# Patient Record
Sex: FEMALE | Race: WHITE | Employment: STUDENT | URBAN - METROPOLITAN AREA
[De-identification: names, ages, dates, MRNs, and addresses within clinical notes are randomized per-mention and may not be internally consistent; named-entity substitution may affect disease eponyms.]

---

## 2018-10-19 ENCOUNTER — APPOINTMENT (OUTPATIENT)
Dept: GENERAL RADIOLOGY | Facility: CLINIC | Age: 23
End: 2018-10-19
Payer: COMMERCIAL

## 2018-10-19 ENCOUNTER — APPOINTMENT (OUTPATIENT)
Dept: GENERAL RADIOLOGY | Facility: CLINIC | Age: 23
End: 2018-10-19
Attending: EMERGENCY MEDICINE
Payer: COMMERCIAL

## 2018-10-19 ENCOUNTER — APPOINTMENT (OUTPATIENT)
Dept: GENERAL RADIOLOGY | Facility: CLINIC | Age: 23
End: 2018-10-19
Attending: FAMILY MEDICINE
Payer: COMMERCIAL

## 2018-10-19 ENCOUNTER — HOSPITAL ENCOUNTER (EMERGENCY)
Facility: CLINIC | Age: 23
Discharge: HOME | End: 2018-10-19
Attending: EMERGENCY MEDICINE | Admitting: EMERGENCY MEDICINE
Payer: COMMERCIAL

## 2018-10-19 VITALS
RESPIRATION RATE: 16 BRPM | HEART RATE: 81 BPM | DIASTOLIC BLOOD PRESSURE: 89 MMHG | OXYGEN SATURATION: 98 % | TEMPERATURE: 98.7 F | SYSTOLIC BLOOD PRESSURE: 137 MMHG | WEIGHT: 189 LBS

## 2018-10-19 DIAGNOSIS — S60.512A HAND ABRASION, LEFT, INITIAL ENCOUNTER: ICD-10-CM

## 2018-10-19 DIAGNOSIS — S52.372A CLOSED GALEAZZI'S FRACTURE OF LEFT RADIUS, INITIAL ENCOUNTER: ICD-10-CM

## 2018-10-19 LAB
HCG UR QL: NEGATIVE
INTERNAL QC OK POCT: YES

## 2018-10-19 PROCEDURE — 99285 EMERGENCY DEPT VISIT HI MDM: CPT | Mod: 25

## 2018-10-19 PROCEDURE — 25505 CLTX RDL SHFT FX W/MNPJ: CPT

## 2018-10-19 PROCEDURE — 40000986 XR FOREARM LT 2 VW

## 2018-10-19 PROCEDURE — 40000278 XR SURGERY CARM FLUORO LESS THAN 5 MIN: Mod: TC

## 2018-10-19 PROCEDURE — 25000128 H RX IP 250 OP 636

## 2018-10-19 PROCEDURE — 99285 EMERGENCY DEPT VISIT HI MDM: CPT | Mod: 25 | Performed by: EMERGENCY MEDICINE

## 2018-10-19 PROCEDURE — 73090 X-RAY EXAM OF FOREARM: CPT | Mod: LT

## 2018-10-19 PROCEDURE — 99156 MOD SED OTH PHYS/QHP 5/>YRS: CPT | Mod: Z6 | Performed by: FAMILY MEDICINE

## 2018-10-19 PROCEDURE — 40000986 XR WRIST LEFT G/E 3 VIEWS

## 2018-10-19 PROCEDURE — 81025 URINE PREGNANCY TEST: CPT | Performed by: EMERGENCY MEDICINE

## 2018-10-19 PROCEDURE — 73110 X-RAY EXAM OF WRIST: CPT | Mod: LT

## 2018-10-19 PROCEDURE — 96361 HYDRATE IV INFUSION ADD-ON: CPT | Mod: 59

## 2018-10-19 PROCEDURE — 25000132 ZZH RX MED GY IP 250 OP 250 PS 637: Performed by: FAMILY MEDICINE

## 2018-10-19 PROCEDURE — 25000128 H RX IP 250 OP 636: Performed by: EMERGENCY MEDICINE

## 2018-10-19 PROCEDURE — 96374 THER/PROPH/DIAG INJ IV PUSH: CPT

## 2018-10-19 PROCEDURE — 99156 MOD SED OTH PHYS/QHP 5/>YRS: CPT

## 2018-10-19 RX ORDER — SODIUM CHLORIDE 9 MG/ML
INJECTION, SOLUTION INTRAVENOUS
Status: COMPLETED
Start: 2018-10-19 | End: 2018-10-19

## 2018-10-19 RX ORDER — OXYCODONE HYDROCHLORIDE 5 MG/1
5 TABLET ORAL EVERY 6 HOURS PRN
Qty: 12 TABLET | Refills: 0 | Status: SHIPPED | OUTPATIENT
Start: 2018-10-19 | End: 2018-10-31

## 2018-10-19 RX ORDER — LIDOCAINE HYDROCHLORIDE 10 MG/ML
INJECTION, SOLUTION EPIDURAL; INFILTRATION; INTRACAUDAL; PERINEURAL
Status: DISCONTINUED
Start: 2018-10-19 | End: 2018-10-19 | Stop reason: HOSPADM

## 2018-10-19 RX ORDER — IBUPROFEN 800 MG/1
800 TABLET, FILM COATED ORAL EVERY 8 HOURS PRN
Qty: 20 TABLET | Refills: 0 | Status: SHIPPED | OUTPATIENT
Start: 2018-10-19 | End: 2019-03-27

## 2018-10-19 RX ORDER — PROPOFOL 10 MG/ML
50 INJECTION, EMULSION INTRAVENOUS ONCE
Status: DISCONTINUED | OUTPATIENT
Start: 2018-10-19 | End: 2018-10-19 | Stop reason: CLARIF

## 2018-10-19 RX ORDER — IBUPROFEN 800 MG/1
800 TABLET, FILM COATED ORAL ONCE
Status: COMPLETED | OUTPATIENT
Start: 2018-10-19 | End: 2018-10-19

## 2018-10-19 RX ORDER — PROPOFOL 10 MG/ML
50 INJECTION, EMULSION INTRAVENOUS ONCE
Status: COMPLETED | OUTPATIENT
Start: 2018-10-19 | End: 2018-10-19

## 2018-10-19 RX ORDER — SODIUM CHLORIDE 9 MG/ML
INJECTION, SOLUTION INTRAVENOUS
Status: DISCONTINUED
Start: 2018-10-19 | End: 2018-10-19 | Stop reason: HOSPADM

## 2018-10-19 RX ORDER — OXYCODONE AND ACETAMINOPHEN 5; 325 MG/1; MG/1
1-2 TABLET ORAL EVERY 4 HOURS PRN
Qty: 12 TABLET | Refills: 0 | Status: SHIPPED | OUTPATIENT
Start: 2018-10-19 | End: 2018-10-22

## 2018-10-19 RX ORDER — HYDROMORPHONE HYDROCHLORIDE 1 MG/ML
0.5 INJECTION, SOLUTION INTRAMUSCULAR; INTRAVENOUS; SUBCUTANEOUS
Status: DISCONTINUED | OUTPATIENT
Start: 2018-10-19 | End: 2018-10-19 | Stop reason: HOSPADM

## 2018-10-19 RX ORDER — ACETAMINOPHEN 500 MG
1000 TABLET ORAL ONCE
Status: COMPLETED | OUTPATIENT
Start: 2018-10-19 | End: 2018-10-19

## 2018-10-19 RX ADMIN — IBUPROFEN 800 MG: 800 TABLET ORAL at 12:19

## 2018-10-19 RX ADMIN — PROPOFOL 50 MG: 10 INJECTION, EMULSION INTRAVENOUS at 16:50

## 2018-10-19 RX ADMIN — ACETAMINOPHEN 1000 MG: 500 TABLET ORAL at 12:20

## 2018-10-19 RX ADMIN — SODIUM CHLORIDE 1000 ML: 9 INJECTION, SOLUTION INTRAVENOUS at 13:59

## 2018-10-19 RX ADMIN — Medication 0.5 MG: at 13:57

## 2018-10-19 ASSESSMENT — ENCOUNTER SYMPTOMS
COLOR CHANGE: 0
CONFUSION: 0
NUMBNESS: 1
ARTHRALGIAS: 0
NECK STIFFNESS: 0
DIFFICULTY URINATING: 0
HEADACHES: 0
SHORTNESS OF BREATH: 0
FEVER: 0
EYE REDNESS: 0
ABDOMINAL PAIN: 0
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
## 2018-10-19 NOTE — ED AVS SNAPSHOT
[facility], Emergency Department    [address] [place] [address]    Phone:  [phone]    Fax:  [phone]                                       Gela Miles   MRN: [id number]    Department:  [facility], Emergency Department   Date of Visit:  10/19/2018           After Visit Summary Signature Page     I have received my discharge instructions, and my questions have been answered. I have discussed any challenges I see with this plan with the nurse or doctor.    ..........................................................................................................................................  Patient/Patient Representative Signature      ..........................................................................................................................................  Patient Representative Print Name and Relationship to Patient    ..................................................               ................................................  Date                                   Time    ..........................................................................................................................................  Reviewed by Signature/Title    ...................................................              ..............................................  Date                                               Time          22EPIC Rev 08/18

## 2018-10-19 NOTE — ED AVS SNAPSHOT
[facility], Emergency Department    [address]    [place] [address]    Phone:  [phone]    Fax:  [phone]                                       Gela Miles   MRN: [id number]    Department:  [facility], Emergency Department   Date of Visit:  10/19/2018           Patient Information     Date Of Birth          1995        Your diagnoses for this visit were:     Closed Galeazzi's fracture of left radius, initial encounter     Hand abrasion, left, initial encounter        You were seen by Joao Jonas MD and Darrel Tomas MD.        Discharge Instructions       Follow-up with orthopedics as discussed with the physician in the emergency department today    Abrasions  Abrasions are skin scrapes. Their treatment depends on how large and deep the abrasion is.  Home care  You may be prescribed an antibiotic cream or ointment to apply to the wound. This helps prevent infection. Follow instructions when using this medicine.  General care    To care for the abrasion, do the following each day for as long as directed by your healthcare provider.  ? If you were given a bandage, change it once a day. If your bandage sticks to the wound, soak it in warm water until it loosens.  ? Wash the area with soap and warm water. You may do this in a sink or under a tub faucet or shower. Rinse off the soap. Then pat the area dry with a clean towel.  ? If antibiotic ointment or cream was prescribed, reapply it to the wound as directed. Cover the wound with a fresh nonstick bandage. If the bandage becomes wet or dirty, change it as soon as possible.  ? Some antibiotic ointments or cream can cause an allergic reaction or dermatitis. This may cause redness, itching and or hives. If this occurs, stop using the ointment immediately and wash off any remaining ointment. You may need to take some allergy medicine to relieve symptoms.    You may use acetaminophen or ibuprofen to control pain unless another pain  medicine was prescribed. Talk with your healthcare provider before using these medicines if you have chronic liver or kidney disease or ever had a stomach ulcer or GI bleeding. Don t use ibuprofen in children younger than six months old.    Most skin wounds heal within 10 days. But an infection may occur even with treatment. So it s important to watch the wound for signs of infection as listed below.  Follow-up care  Follow up with your healthcare provider, or as advised.  When to seek medical advice  Call your healthcare provider right away if any of these occur:    Fever of 100.4 F (38 C) or higher, or as directed by your healthcare provider    Increasing pain, redness, swelling, or drainage from the wound    Bleeding from the wound that does not stop after a few minutes of steady, firm pressure    Decreased ability to move any body part near the wound  Date Last Reviewed: 3/3/2017    0970-9846 [employer]. [address] [place] [address]. All rights reserved. This information is not intended as a substitute for professional medical care. Always follow your healthcare professional's instructions.        Upper Extremity Fracture    You have a break (fracture) of the arm, wrist, or hand. This may be a small crack in the bone. Or it may be a major break, with the broken parts pushed out of position. Most fractures will heal without surgery. But you may need surgery if the bones are far out of place or if the break is near the elbow. Treatment is with a special sling called a shoulder immobilizer, or a splint or cast, depending on the type of fracture and where the fracture is. This fracture takes 4 to 6 weeks or longer to heal. The cast may need to be changed in 2 to 3 weeks as swelling goes down.  Home care  Follow these guidelines when caring for yourself:    If you were given a shoulder immobilizer, leave it in place. This will support the injured arm at your side. This is the best position  for bone healing. The shoulder immobilizer can be adjusted. If it becomes loose, adjust it so that your forearm is level with the ground (horizontal). Your hand should be level with your elbow.    Put an ice pack on the injured area. Do this for 20 minutes every 1 to 2 hours the first day for pain relief. You can make an ice pack by wrapping a plastic bag of ice cubes in a thin towel. As the ice melts, be careful that the cast/splint/sling doesn t get wet. You can put the ice pack inside the sling and directly over the splint or cast. Continue using the ice pack 3 to 4 times a day for the next 2 days. Then use the ice pack as needed to ease pain and swelling.    Keep the cast, splint, or sling completely dry at all times. Bathe with your cast, splint, or sling out of the water. Protect it with a large plastic bag, rubber-banded at the top end. If a fiberglass cast, splint, or sling gets wet, you can dry it with a hair dryer.    You may use acetaminophen or ibuprofen to control pain, unless another pain medicine was prescribed. If you have chronic liver or kidney disease, talk with your healthcare provider before using these medicines. Also talk with your provider if you ve had a stomach ulcer or gastrointestinal bleeding.    Don t put creams or objects under the cast if you have itching.  Follow-up care  Follow up with your healthcare provider, or as advised. This is to make sure the bone is healing the way it should.  X-rays may be taken. You will be told of any new findings that may affect your care.  When to seek medical advice  Call your health care provider right away if any of these occur:    The cast or splint cracks    The plaster cast or splint becomes wet or soft    The fiberglass cast or splint stays wet for more than 24 hours    Bad odor from the cast or wound fluid stains the cast    Tightness or pain under the cast or splint gets worse    Fingers become swollen, cold, blue, numb, or tingly    You can t  move your fingers    Skin around cast or splint becomes red    Fever of 100.4 F (38 C) or higher, or as directed by your healthcare provider  Date Last Reviewed: 2/1/2017 2000-2017 [employer]. [address] [place] [address], [place] [address]. All rights reserved. This information is not intended as a substitute for professional medical care. Always follow your healthcare professional's instructions.          Forearm Fracture That Needs to Be Set  You have a break or fracture of both bones in the forearm. The bones are out of place and must be set to make them straight again. This fracture usually takes 8 to 12 weeks to heal completely. Initial treatment is with a splint or cast. Severe injuries may need surgery to repair.    Home care    Keep your arm elevated to reduce pain and swelling.When sitting or lying down elevate your arm above the level of your heart. You can do this by placing your arm on a pillow that rests on your chest or on a pillow at your side. This is most important during the first 48 hours after injury.    Apply an ice pack over the injured area for 15 to 20 minutes every 3 to 6 hours. You should do this for the first 24 to 48 hours. You can make an ice pack by filling a plastic bag that seals at the top with ice cubes and then wrapping it with a thin towel. Be careful not to injure your skin with the ice treatments. Ice should never be applied directly to skin. As the ice melts, be careful that the cast or splint doesn t get wet. You can place the ice pack inside the sling and directly over the splint or cast. Continue with ice packs as needed for the relief of pain and swelling.    Keep the cast or splint completely dry at all times. Bathe with your cast or splint out of the water. Protect it with 2 large plastic bags, one outside of the other, each taped with duct tape at the top end. If a fiberglass splint or cast gets wet, you can dry it with a hair dryer on a cool setting.    You  may use over-the-counter pain medicine to control pain, unless another pain medicine was prescribed. If you have chronic liver or kidney disease or ever had a stomach ulcer or GI bleeding, talk with your healthcare provider before using these medicines.  Follow-up care  Follow up with your healthcare provider, or as advised. If a splint was applied, it may be changed to a cast during your follow-up visit.  There is a chance that the fractures will move out of place again before the ends begin to seal together. This usually happens during the first week. Therefore, it is important that you follow-up as directed for another X-ray. If X-rays were taken, you will be told of any new findings that may affect your care.  When to seek medical advice  Call your healthcare provider right away if any of the following occur:    The plaster cast or splint becomes wet or soft    The fiberglass cast or splint stays wet for more than 24 hours    Increased tightness, looseness, or pain occurs under the cast or splint    Fingers become swollen, cold, blue, numb, or tingly    The cast develops a foul odor  Date Last Reviewed: 12/3/2015    9425-7221 [employer]. [address] [place] [address]. All rights reserved. This information is not intended as a substitute for professional medical care. Always follow your healthcare professional's instructions.          24 Hour Appointment Hotline       To make an appointment at any [facility], call [phone] ([phone]). If you don't have a family doctor or clinic, we will help you find one. [facility] clinics are conveniently located to serve the needs of you and your family.             Review of your medicines      START taking        Dose / Directions Last dose taken    ibuprofen 800 MG tablet   Commonly known as:  ADVIL/MOTRIN   Dose:  800 mg   Quantity:  20 tablet        Take 1 tablet (800 mg) by mouth every 8 hours as needed for moderate pain    Refills:  0        oxyCODONE IR 5 MG tablet   Commonly known as:  ROXICODONE   Dose:  5 mg   Quantity:  12 tablet        Take 1 tablet (5 mg) by mouth every 6 hours as needed for pain   Refills:  0        oxyCODONE-acetaminophen 5-325 MG per tablet   Commonly known as:  PERCOCET   Dose:  1-2 tablet   Quantity:  12 tablet        Take 1-2 tablets by mouth every 4 hours as needed for pain   Refills:  0          Our records show that you are taking the medicines listed below. If these are incorrect, please call your family doctor or clinic.        Dose / Directions Last dose taken    levonorgestrel 20 MCG/24HR IUD   Commonly known as:  MIRENA   Dose:  1 each        1 each by Intrauterine route once   Refills:  0                Information about OPIOIDS     PRESCRIPTION OPIOIDS: WHAT YOU NEED TO KNOW   We gave you an opioid (narcotic) pain medicine. It is important to manage your pain, but opioids are not always the best choice. You should first try all the other options your care team gave you. Take this medicine for as short a time (and as few doses) as possible.    Some activities can increase your pain, such as bandage changes or therapy sessions. It may help to take your pain medicine 30 to 60 minutes before these activities. Reduce your stress by getting enough sleep, working on hobbies you enjoy and practicing relaxation or meditation. Talk to your care team about ways to manage your pain beyond prescription opioids.    These medicines have risks:    DO NOT drive when on new or higher doses of pain medicine. These medicines can affect your alertness and reaction times, and you could be arrested for driving under the influence (DUI). If you need to use opioids long-term, talk to your care team about driving.    DO NOT operate heavy machinery    DO NOT do any other dangerous activities while taking these medicines.    DO NOT drink any alcohol while taking these medicines.     If the opioid prescribed includes  acetaminophen, DO NOT take with any other medicines that contain acetaminophen. Read all labels carefully. Look for the word  acetaminophen  or  Tylenol.  Ask your pharmacist if you have questions or are unsure.    You can get addicted to pain medicines, especially if you have a history of addiction (chemical, alcohol or substance dependence). Talk to your care team about ways to reduce this risk.    All opioids tend to cause constipation. Drink plenty of water and eat foods that have a lot of fiber, such as fruits, vegetables, prune juice, apple juice and high-fiber cereal. Take a laxative (Miralax, milk of magnesia, Colace, Senna) if you don t move your bowels at least every other day. Other side effects include upset stomach, sleepiness, dizziness, throwing up, tolerance (needing more of the medicine to have the same effect), physical dependence and slowed breathing.    Store your pills in a secure place, locked if possible. We will not replace any lost or stolen medicine. If you don t finish your medicine, please throw away (dispose) as directed by your pharmacist. The [place] [employer] has more information about safe disposal: https://www.pca.[facility].[place]/living-green/managing-unwanted-medications        Prescriptions were sent or printed at these locations (3 Prescriptions)                   Other Prescriptions                Printed at Department/Unit printer (3 of 3)         ibuprofen (ADVIL/MOTRIN) 800 MG tablet               oxyCODONE IR (ROXICODONE) 5 MG tablet               oxyCODONE-acetaminophen (PERCOCET) 5-325 MG per tablet                Procedures and tests performed during your visit     Procedure/Test Number of Times Performed    Radius/Ulna XR,  PA &LAT, left 1    XR Forearm Left 2 Views 1    XR Surgery HARDEEP Fluoro L/T 5 Min 1    XR Wrist Left G/E 3 Views 2    hCG qual urine POCT 1      Orders Needing Specimen Collection     None      Pending Results     Date and Time Order Name  "Status Description    10/19/2018 1808 XR Forearm Left 2 Views Preliminary     10/19/2018 1808 XR Wrist Left G/E 3 Views Preliminary     10/19/2018 1335 XR Surgery HARDEEP Fluoro L/T 5 Min In process             Pending Culture Results     No orders found from 10/17/2018 to 10/20/2018.            Pending Results Instructions     If you had any lab results that were not finalized at the time of your Discharge, you can call the ED Lab Result RN at [phone]. You will be contacted by this team for any positive Lab results or changes in treatment. The nurses are available 7 days a week from 10A to 6:30P.  You can leave a message 24 hours per day and they will return your call.        Thank you for choosing [facility]       Thank you for choosing [facility] for your care. Our goal is always to provide you with excellent care. Hearing back from our patients is one way we can continue to improve our services. Please take a few minutes to complete the written survey that you may receive in the mail after you visit with us. Thank you!        [employer] Information     [employer] lets you send messages to your doctor, view your test results, renew your prescriptions, schedule appointments and more. To sign up, go to [email or web address] . Click on \"Log in\" on the left side of the screen, which will take you to the Welcome page. Then click on \"Sign up Now\" on the right side of the page.     You will be asked to enter the access code listed below, as well as some personal information. Please follow the directions to create your username and password.     Your access code is: [email or web address]  Expires: 2019  7:35 PM     Your access code will  in 90 days. If you need help or a new code, please call your [facility] clinic or [phone].        Care EveryWhere ID     This is your Care EveryWhere ID. This could be used by other organizations to access your [facility] medical records  [id number]        Equal Access to Services     " WOJCIECH NOGUERA : Hadii giselle Schwartz, waaxda luqadaha, qaybta kaalmada juanis, miya barreto. So [facility] [phone].    ATENCIÓN: Si habla español, tiene a heart disposición servicios gratuitos de asistencia lingüística. Llame al [phone].    We comply with applicable federal civil rights laws and [place] laws. We do not discriminate on the basis of race, color, national origin, age, disability, sex, sexual orientation, or gender identity.            After Visit Summary       This is your record. Keep this with you and show to your community pharmacist(s) and doctor(s) at your next visit.

## 2018-10-20 NOTE — ED NOTES
Sign out Provider: Pritesh      Sign out Plan: She is known to have a both bone fracture of the left forearm.  She was seen in consultation by orthopedics, and a plan was formulated to perform closed reduction under conscious sedation.    [facility] Procedure Note        Sedation:      Performed by: Darrel Tomas  Authorized by: Darrel Tomas    Pre-Procedure Assessment done at 1600.    Expected Level:  Moderate Sedation    Indication:  Sedation is required to allow for fracture reduction    Consent obtained from patient after discussing the risks, benefits and alternatives.    PO Intake:  Appropriately NPO for procedure    ASA Class:  Class 1 - HEALTHY PATIENT    Mallampati:  Grade 2:  Soft palate, base of uvula, tonsillar pillars, and portion of posterior pharyngeal wall visible    Lungs: Lungs Clear with good breath sounds bilaterally.     Heart: Normal heart sounds and rate    History and physical reviewed and no updates needed. I have reviewed the lab findings, diagnostic data, medications, and the plan for sedation. I have determined this patient to be an appropriate candidate for the planned sedation and procedure and have reassessed the patient IMMEDIATELY PRIOR to sedation and procedure.      Sedation Post Procedure Summary:    Prior to the start of the procedure and with procedural staff participation, I verbally confirmed the patient s identity using two indicators, relevant allergies, that the procedure was appropriate and matched the consent or emergent situation, and that the correct equipment/implants were available. Immediately prior to starting the procedure I conducted the Time Out with the procedural staff and re-confirmed the patient s name, procedure, and site/side. (The Joint Commission universal protocol was followed.)  Yes      Sedatives: Propofol    Vital signs, airway, End Tidal CO2 and pulse oximetry were monitored and remained stable throughout the procedure and sedation was  maintained until the procedure was complete.  The patient was monitored by staff until sedation discharge criteria were met.    Patient tolerance: Patient tolerated the procedure well with no immediate complications.    Time of sedation in minutes:  15 minutes from beginning to end of physician one to one monitoring.      Reassessment: Patient has been consented for conscious sedation.  Procedure was performed as documented in the chart above and by orthopedics.  She tolerated the procedure well      Disposition: Post reduction films show adequate reduction.  Plan is for her to follow-up with orthopedics next week for ORIF.  We discussed the indications for emergency department return and follow-up.  Stable for discharge.         Darrel Tomas MD  10/19/18 1938

## 2018-10-20 NOTE — DISCHARGE INSTRUCTIONS
Follow-up with orthopedics as discussed with the physician in the emergency department today    Abrasions  Abrasions are skin scrapes. Their treatment depends on how large and deep the abrasion is.  Home care  You may be prescribed an antibiotic cream or ointment to apply to the wound. This helps prevent infection. Follow instructions when using this medicine.  General care    To care for the abrasion, do the following each day for as long as directed by your healthcare provider.  ? If you were given a bandage, change it once a day. If your bandage sticks to the wound, soak it in warm water until it loosens.  ? Wash the area with soap and warm water. You may do this in a sink or under a tub faucet or shower. Rinse off the soap. Then pat the area dry with a clean towel.  ? If antibiotic ointment or cream was prescribed, reapply it to the wound as directed. Cover the wound with a fresh nonstick bandage. If the bandage becomes wet or dirty, change it as soon as possible.  ? Some antibiotic ointments or cream can cause an allergic reaction or dermatitis. This may cause redness, itching and or hives. If this occurs, stop using the ointment immediately and wash off any remaining ointment. You may need to take some allergy medicine to relieve symptoms.    You may use acetaminophen or ibuprofen to control pain unless another pain medicine was prescribed. Talk with your healthcare provider before using these medicines if you have chronic liver or kidney disease or ever had a stomach ulcer or GI bleeding. Don t use ibuprofen in children younger than six months old.    Most skin wounds heal within 10 days. But an infection may occur even with treatment. So it s important to watch the wound for signs of infection as listed below.  Follow-up care  Follow up with your healthcare provider, or as advised.  When to seek medical advice  Call your healthcare provider right away if any of these occur:    Fever of 100.4 F (38 C) or  higher, or as directed by your healthcare provider    Increasing pain, redness, swelling, or drainage from the wound    Bleeding from the wound that does not stop after a few minutes of steady, firm pressure    Decreased ability to move any body part near the wound  Date Last Reviewed: 3/3/2017    3868-9380 [employer]. [address] [place], [place] [id number]. All rights reserved. This information is not intended as a substitute for professional medical care. Always follow your healthcare professional's instructions.        Upper Extremity Fracture    You have a break (fracture) of the arm, wrist, or hand. This may be a small crack in the bone. Or it may be a major break, with the broken parts pushed out of position. Most fractures will heal without surgery. But you may need surgery if the bones are far out of place or if the break is near the elbow. Treatment is with a special sling called a shoulder immobilizer, or a splint or cast, depending on the type of fracture and where the fracture is. This fracture takes 4 to 6 weeks or longer to heal. The cast may need to be changed in 2 to 3 weeks as swelling goes down.  Home care  Follow these guidelines when caring for yourself:    If you were given a shoulder immobilizer, leave it in place. This will support the injured arm at your side. This is the best position for bone healing. The shoulder immobilizer can be adjusted. If it becomes loose, adjust it so that your forearm is level with the ground (horizontal). Your hand should be level with your elbow.    Put an ice pack on the injured area. Do this for 20 minutes every 1 to 2 hours the first day for pain relief. You can make an ice pack by wrapping a plastic bag of ice cubes in a thin towel. As the ice melts, be careful that the cast/splint/sling doesn t get wet. You can put the ice pack inside the sling and directly over the splint or cast. Continue using the ice pack 3 to 4 times a day for the next  2 days. Then use the ice pack as needed to ease pain and swelling.    Keep the cast, splint, or sling completely dry at all times. Bathe with your cast, splint, or sling out of the water. Protect it with a large plastic bag, rubber-banded at the top end. If a fiberglass cast, splint, or sling gets wet, you can dry it with a hair dryer.    You may use acetaminophen or ibuprofen to control pain, unless another pain medicine was prescribed. If you have chronic liver or kidney disease, talk with your healthcare provider before using these medicines. Also talk with your provider if you ve had a stomach ulcer or gastrointestinal bleeding.    Don t put creams or objects under the cast if you have itching.  Follow-up care  Follow up with your healthcare provider, or as advised. This is to make sure the bone is healing the way it should.  X-rays may be taken. You will be told of any new findings that may affect your care.  When to seek medical advice  Call your health care provider right away if any of these occur:    The cast or splint cracks    The plaster cast or splint becomes wet or soft    The fiberglass cast or splint stays wet for more than 24 hours    Bad odor from the cast or wound fluid stains the cast    Tightness or pain under the cast or splint gets worse    Fingers become swollen, cold, blue, numb, or tingly    You can t move your fingers    Skin around cast or splint becomes red    Fever of 100.4 F (38 C) or higher, or as directed by your healthcare provider  Date Last Reviewed: 2/1/2017 2000-2017 [employer]. [address] [place] [address]. All rights reserved. This information is not intended as a substitute for professional medical care. Always follow your healthcare professional's instructions.          Forearm Fracture That Needs to Be Set  You have a break or fracture of both bones in the forearm. The bones are out of place and must be set to make them straight again. This  fracture usually takes 8 to 12 weeks to heal completely. Initial treatment is with a splint or cast. Severe injuries may need surgery to repair.    Home care    Keep your arm elevated to reduce pain and swelling.When sitting or lying down elevate your arm above the level of your heart. You can do this by placing your arm on a pillow that rests on your chest or on a pillow at your side. This is most important during the first 48 hours after injury.    Apply an ice pack over the injured area for 15 to 20 minutes every 3 to 6 hours. You should do this for the first 24 to 48 hours. You can make an ice pack by filling a plastic bag that seals at the top with ice cubes and then wrapping it with a thin towel. Be careful not to injure your skin with the ice treatments. Ice should never be applied directly to skin. As the ice melts, be careful that the cast or splint doesn t get wet. You can place the ice pack inside the sling and directly over the splint or cast. Continue with ice packs as needed for the relief of pain and swelling.    Keep the cast or splint completely dry at all times. Bathe with your cast or splint out of the water. Protect it with 2 large plastic bags, one outside of the other, each taped with duct tape at the top end. If a fiberglass splint or cast gets wet, you can dry it with a hair dryer on a cool setting.    You may use over-the-counter pain medicine to control pain, unless another pain medicine was prescribed. If you have chronic liver or kidney disease or ever had a stomach ulcer or GI bleeding, talk with your healthcare provider before using these medicines.  Follow-up care  Follow up with your healthcare provider, or as advised. If a splint was applied, it may be changed to a cast during your follow-up visit.  There is a chance that the fractures will move out of place again before the ends begin to seal together. This usually happens during the first week. Therefore, it is important that you  follow-up as directed for another X-ray. If X-rays were taken, you will be told of any new findings that may affect your care.  When to seek medical advice  Call your healthcare provider right away if any of the following occur:    The plaster cast or splint becomes wet or soft    The fiberglass cast or splint stays wet for more than 24 hours    Increased tightness, looseness, or pain occurs under the cast or splint    Fingers become swollen, cold, blue, numb, or tingly    The cast develops a foul odor  Date Last Reviewed: 12/3/2015    1407-1611 [employer]. [address] [place] [address]. All rights reserved. This information is not intended as a substitute for professional medical care. Always follow your healthcare professional's instructions.

## 2018-10-22 ENCOUNTER — OFFICE VISIT (OUTPATIENT)
Dept: ORTHOPEDICS | Facility: CLINIC | Age: 23
End: 2018-10-22
Payer: COMMERCIAL

## 2018-10-22 ENCOUNTER — ANESTHESIA EVENT (OUTPATIENT)
Dept: SURGERY | Facility: AMBULATORY SURGERY CENTER | Age: 23
End: 2018-10-22

## 2018-10-22 VITALS — HEIGHT: 70 IN | BODY MASS INDEX: 27.26 KG/M2 | WEIGHT: 190.4 LBS

## 2018-10-22 DIAGNOSIS — S52.372A CLOSED GALEAZZI'S FRACTURE OF LEFT RADIUS, INITIAL ENCOUNTER: Primary | ICD-10-CM

## 2018-10-22 RX ORDER — OXYCODONE AND ACETAMINOPHEN 5; 325 MG/1; MG/1
1-2 TABLET ORAL EVERY 4 HOURS PRN
Qty: 12 TABLET | Refills: 0 | Status: SHIPPED | OUTPATIENT
Start: 2018-10-22 | End: 2019-03-27

## 2018-10-22 ASSESSMENT — ENCOUNTER SYMPTOMS
BACK PAIN: 0
NECK PAIN: 0
MUSCLE CRAMPS: 0
STIFFNESS: 0
MUSCLE WEAKNESS: 0
JOINT SWELLING: 0
ARTHRALGIAS: 0
MYALGIAS: 0
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
## 2018-10-22 NOTE — NURSING NOTE
Teaching Flowsheet   Relevant Diagnosis: Left galeazzi fracture  Teaching Topic: ORIF left radial shaft fracture, possible TFCC repair     Person(s) involved in teaching:   Patient and boyfriend     Motivation Level:  Asks Questions: Yes  Eager to Learn: Yes  Cooperative: Yes  Receptive (willing/able to accept information): Yes  Any cultural factors/Protestant beliefs that may influence understanding or compliance? No     Patient demonstrates understanding of the following:  Reason for the appointment, diagnosis and treatment plan: Yes  Knowledge of proper use of medications and conditions for which they are ordered (with special attention to potential side effects or drug interactions): Yes  Which situations necessitate calling provider and whom to contact: Yes    Teaching Concerns Addressed:   Comments: N/a     Nutritional needs and diet plan: Yes  Pain management techniques: Yes  Wound Care: Yes  How and/when to access community resources: Yes     Instructional Materials Used/Given: Pre-op packet given and reviewed with patient. Antiseptic soap given.  Patient will have surgery tomorrow, 10/23/18, [place] [place] [facility]  She was given a refill of percocet today.  She was also given a letter for missing school today and tomorrow.      Time spent with patient: 15 minutes.

## 2018-10-22 NOTE — MR AVS SNAPSHOT
After Visit Summary   10/22/2018    Gela Miles    MRN: [id number]           Patient Information     Date Of Birth          1995        Visit Information        Provider Department      10/22/2018 8:30 AM Barron Ybarra MD [facility]        Today's Diagnoses     Closed Galeazzi's fracture of left radius, initial encounter    -  1       Follow-ups after your visit        Your next 10 appointments already scheduled     Oct 23, 2018   Procedure with Barron Ybarra MD   [facility] ([facility])    [address] [place] [address]  5th [facility] [id number] [phone]           Located in the Clinics and Surgery Center at [address] [place] [address].   parking is very convenient and highly recommended.  is a $6 flat rate fee.  Both  and self parkers should enter the main arrival plaza from [facility]; parking attendants will direct you based on your parking preference.              Who to contact     Please call your clinic at [phone] to:    Ask questions about your health    Make or cancel appointments    Discuss your medicines    Learn about your test results    Speak to your doctor            Additional Information About Your Visit        MyChart Information     [employer] is an electronic gateway that provides easy, online access to your medical records. With [employer], you can request a clinic appointment, read your test results, renew a prescription or communicate with your care team.     To sign up for [employer] visit the website at [email or web address]   You will be asked to enter the access code listed below, as well as some personal information. Please follow the directions to create your username and password.     Your access code is: [email or web address]  Expires: 2019  7:35 PM     Your access code will  in 90 days. If you need help or a new code, please contact your  "[facility] or call [phone] for assistance.        Care EveryWhere ID     This is your Care EveryWhere ID. This could be used by other organizations to access your [place] medical records  [id number]        Your Vitals Were     Height BMI (Body Mass Index)                1.77 m (5' 9.69\") 27.57 kg/m2           Blood Pressure from Last 3 Encounters:   10/19/18 137/89    Weight from Last 3 Encounters:   10/22/18 86.4 kg (190 lb 6.4 oz)   10/19/18 85.7 kg (189 lb)              We Performed the Following     Etta-Operative Worksheet (Hand)          Where to get your medicines      Some of these will need a paper prescription and others can be bought over the counter.  Ask your nurse if you have questions.     Bring a paper prescription for each of these medications     oxyCODONE-acetaminophen 5-325 MG per tablet         Information about OPIOIDS     PRESCRIPTION OPIOIDS: WHAT YOU NEED TO KNOW   We gave you an opioid (narcotic) pain medicine. It is important to manage your pain, but opioids are not always the best choice. You should first try all the other options your care team gave you. Take this medicine for as short a time (and as few doses) as possible.    Some activities can increase your pain, such as bandage changes or therapy sessions. It may help to take your pain medicine 30 to 60 minutes before these activities. Reduce your stress by getting enough sleep, working on hobbies you enjoy and practicing relaxation or meditation. Talk to your care team about ways to manage your pain beyond prescription opioids.    These medicines have risks:    DO NOT drive when on new or higher doses of pain medicine. These medicines can affect your alertness and reaction times, and you could be arrested for driving under the influence (DUI). If you need to use opioids long-term, talk to your care team about driving.    DO NOT operate heavy machinery    DO NOT do any other dangerous activities " while taking these medicines.    DO NOT drink any alcohol while taking these medicines.     If the opioid prescribed includes acetaminophen, DO NOT take with any other medicines that contain acetaminophen. Read all labels carefully. Look for the word  acetaminophen  or  Tylenol.  Ask your pharmacist if you have questions or are unsure.    You can get addicted to pain medicines, especially if you have a history of addiction (chemical, alcohol or substance dependence). Talk to your care team about ways to reduce this risk.    All opioids tend to cause constipation. Drink plenty of water and eat foods that have a lot of fiber, such as fruits, vegetables, prune juice, apple juice and high-fiber cereal. Take a laxative (Miralax, milk of magnesia, Colace, Senna) if you don t move your bowels at least every other day. Other side effects include upset stomach, sleepiness, dizziness, throwing up, tolerance (needing more of the medicine to have the same effect), physical dependence and slowed breathing.    Store your pills in a secure place, locked if possible. We will not replace any lost or stolen medicine. If you don t finish your medicine, please throw away (dispose) as directed by your pharmacist. The [place] Pollution Control Agency has more information about safe disposal: https://www.pca.[facility].mn.us/living-green/managing-unwanted-medications         Primary Care Provider    Provider Not In System                Equal Access to Services     WOJCIECH NOGUERA : Anibal Schwartz, waguilleda lupauladaha, qaybta kaalmada juanis, miya barreto. So [facility] [phone].    ATENCIÓN: Si habla español, tiene a heart disposición servicios gratuitos de asistencia lingüística. Llame al [phone].    We comply with applicable federal civil rights laws and [place] laws. We do not discriminate on the basis of race, color, national origin, age, disability, sex, sexual orientation, or gender  identity.            Thank you!     Thank you for choosing [facility]  for your care. Our goal is always to provide you with excellent care. Hearing back from our patients is one way we can continue to improve our services. Please take a few minutes to complete the written survey that you may receive in the mail after your visit with us. Thank you!             Your Updated Medication List - Protect others around you: Learn how to safely use, store and throw away your medicines at www.disposemymeds.org.          This list is accurate as of 10/22/18 11:44 AM.  Always use your most recent med list.                   Brand Name Dispense Instructions for use Diagnosis    ibuprofen 800 MG tablet    ADVIL/MOTRIN    20 tablet    Take 1 tablet (800 mg) by mouth every 8 hours as needed for moderate pain        levonorgestrel 20 MCG/24HR IUD    MIRENA     1 each by Intrauterine route once        oxyCODONE IR 5 MG tablet    ROXICODONE    12 tablet    Take 1 tablet (5 mg) by mouth every 6 hours as needed for pain        oxyCODONE-acetaminophen 5-325 MG per tablet    PERCOCET    12 tablet    Take 1-2 tablets by mouth every 4 hours as needed for pain
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
## 2018-10-22 NOTE — LETTER
Date:October 23, 2018      Patient was self referred, no letter generated. Do not send.        [facility] Health Information

## 2018-10-23 ENCOUNTER — SURGERY (OUTPATIENT)
Age: 23
End: 2018-10-23

## 2018-10-23 ENCOUNTER — HOSPITAL ENCOUNTER (OUTPATIENT)
Facility: AMBULATORY SURGERY CENTER | Age: 23
End: 2018-10-23
Attending: ORTHOPAEDIC SURGERY
Payer: COMMERCIAL

## 2018-10-23 ENCOUNTER — RADIANT APPOINTMENT (OUTPATIENT)
Dept: RADIOLOGY | Facility: AMBULATORY SURGERY CENTER | Age: 23
End: 2018-10-23
Attending: ORTHOPAEDIC SURGERY
Payer: COMMERCIAL

## 2018-10-23 ENCOUNTER — ANESTHESIA (OUTPATIENT)
Dept: SURGERY | Facility: AMBULATORY SURGERY CENTER | Age: 23
End: 2018-10-23

## 2018-10-23 VITALS
TEMPERATURE: 99.1 F | SYSTOLIC BLOOD PRESSURE: 119 MMHG | DIASTOLIC BLOOD PRESSURE: 68 MMHG | HEIGHT: 70 IN | HEART RATE: 78 BPM | BODY MASS INDEX: 27.29 KG/M2 | RESPIRATION RATE: 15 BRPM | WEIGHT: 190.6 LBS | OXYGEN SATURATION: 95 %

## 2018-10-23 DIAGNOSIS — S52.333A: Primary | ICD-10-CM

## 2018-10-23 DIAGNOSIS — S52.92XA FRACTURE OF LEFT RADIUS: ICD-10-CM

## 2018-10-23 DEVICE — IMP SCR SYN CORTEX 3.5X16MM SELF TAP SS 204.816: Type: IMPLANTABLE DEVICE | Site: RADIUS | Status: FUNCTIONAL

## 2018-10-23 DEVICE — IMP SCR SYN CORTEX 3.5X14MM SELF TAP SS 204.814: Type: IMPLANTABLE DEVICE | Site: RADIUS | Status: FUNCTIONAL

## 2018-10-23 DEVICE — IMPLANTABLE DEVICE: Type: IMPLANTABLE DEVICE | Site: ULNA | Status: FUNCTIONAL

## 2018-10-23 RX ORDER — ONDANSETRON 4 MG/1
4 TABLET, ORALLY DISINTEGRATING ORAL EVERY 30 MIN PRN
Status: DISCONTINUED | OUTPATIENT
Start: 2018-10-23 | End: 2018-10-24 | Stop reason: HOSPADM

## 2018-10-23 RX ORDER — SODIUM CHLORIDE, SODIUM LACTATE, POTASSIUM CHLORIDE, CALCIUM CHLORIDE 600; 310; 30; 20 MG/100ML; MG/100ML; MG/100ML; MG/100ML
INJECTION, SOLUTION INTRAVENOUS CONTINUOUS
Status: DISCONTINUED | OUTPATIENT
Start: 2018-10-23 | End: 2018-10-23 | Stop reason: HOSPADM

## 2018-10-23 RX ORDER — IBUPROFEN 600 MG/1
600 TABLET, FILM COATED ORAL EVERY 6 HOURS PRN
Qty: 30 TABLET | Refills: 0 | Status: SHIPPED | OUTPATIENT
Start: 2018-10-23 | End: 2019-03-27

## 2018-10-23 RX ORDER — OXYCODONE HYDROCHLORIDE 5 MG/1
5 TABLET ORAL
Status: DISCONTINUED | OUTPATIENT
Start: 2018-10-23 | End: 2018-10-24 | Stop reason: HOSPADM

## 2018-10-23 RX ORDER — ONDANSETRON 2 MG/ML
INJECTION INTRAMUSCULAR; INTRAVENOUS PRN
Status: DISCONTINUED | OUTPATIENT
Start: 2018-10-23 | End: 2018-10-23

## 2018-10-23 RX ORDER — KETAMINE HYDROCHLORIDE 10 MG/ML
INJECTION, SOLUTION INTRAMUSCULAR; INTRAVENOUS PRN
Status: DISCONTINUED | OUTPATIENT
Start: 2018-10-23 | End: 2018-10-23

## 2018-10-23 RX ORDER — BUPIVACAINE HYDROCHLORIDE AND EPINEPHRINE 5; 5 MG/ML; UG/ML
INJECTION, SOLUTION PERINEURAL PRN
Status: DISCONTINUED | OUTPATIENT
Start: 2018-10-23 | End: 2018-10-23

## 2018-10-23 RX ORDER — NALOXONE HYDROCHLORIDE 0.4 MG/ML
.1-.4 INJECTION, SOLUTION INTRAMUSCULAR; INTRAVENOUS; SUBCUTANEOUS
Status: DISCONTINUED | OUTPATIENT
Start: 2018-10-23 | End: 2018-10-24 | Stop reason: HOSPADM

## 2018-10-23 RX ORDER — PROPOFOL 10 MG/ML
INJECTION, EMULSION INTRAVENOUS PRN
Status: DISCONTINUED | OUTPATIENT
Start: 2018-10-23 | End: 2018-10-23

## 2018-10-23 RX ORDER — FENTANYL CITRATE 50 UG/ML
25-50 INJECTION, SOLUTION INTRAMUSCULAR; INTRAVENOUS
Status: DISCONTINUED | OUTPATIENT
Start: 2018-10-23 | End: 2018-10-23 | Stop reason: HOSPADM

## 2018-10-23 RX ORDER — OXYCODONE HYDROCHLORIDE 5 MG/1
5 TABLET ORAL EVERY 4 HOURS PRN
Status: DISCONTINUED | OUTPATIENT
Start: 2018-10-23 | End: 2018-10-24 | Stop reason: HOSPADM

## 2018-10-23 RX ORDER — FLUMAZENIL 0.1 MG/ML
0.2 INJECTION, SOLUTION INTRAVENOUS
Status: DISCONTINUED | OUTPATIENT
Start: 2018-10-23 | End: 2018-10-23 | Stop reason: HOSPADM

## 2018-10-23 RX ORDER — PROPOFOL 10 MG/ML
INJECTION, EMULSION INTRAVENOUS CONTINUOUS PRN
Status: DISCONTINUED | OUTPATIENT
Start: 2018-10-23 | End: 2018-10-23

## 2018-10-23 RX ORDER — GABAPENTIN 300 MG/1
300 CAPSULE ORAL ONCE
Status: COMPLETED | OUTPATIENT
Start: 2018-10-23 | End: 2018-10-23

## 2018-10-23 RX ORDER — CEFAZOLIN SODIUM 2 G/50ML
2 SOLUTION INTRAVENOUS
Status: COMPLETED | OUTPATIENT
Start: 2018-10-23 | End: 2018-10-23

## 2018-10-23 RX ORDER — CEFAZOLIN SODIUM 1 G/50ML
1 SOLUTION INTRAVENOUS SEE ADMIN INSTRUCTIONS
Status: DISCONTINUED | OUTPATIENT
Start: 2018-10-23 | End: 2018-10-23 | Stop reason: HOSPADM

## 2018-10-23 RX ORDER — ONDANSETRON 4 MG/1
4 TABLET, ORALLY DISINTEGRATING ORAL
Status: DISCONTINUED | OUTPATIENT
Start: 2018-10-23 | End: 2018-10-24 | Stop reason: HOSPADM

## 2018-10-23 RX ORDER — LIDOCAINE 40 MG/G
CREAM TOPICAL
Status: DISCONTINUED | OUTPATIENT
Start: 2018-10-23 | End: 2018-10-23 | Stop reason: HOSPADM

## 2018-10-23 RX ORDER — HYDROCODONE BITARTRATE AND ACETAMINOPHEN 5; 325 MG/1; MG/1
1-2 TABLET ORAL EVERY 4 HOURS PRN
Qty: 25 TABLET | Refills: 0 | Status: SHIPPED | OUTPATIENT
Start: 2018-10-23 | End: 2019-03-27

## 2018-10-23 RX ORDER — AMOXICILLIN 250 MG
1-2 CAPSULE ORAL 2 TIMES DAILY
Qty: 30 TABLET | Refills: 0 | Status: SHIPPED | OUTPATIENT
Start: 2018-10-23 | End: 2019-03-27

## 2018-10-23 RX ORDER — KETOROLAC TROMETHAMINE 30 MG/ML
INJECTION, SOLUTION INTRAMUSCULAR; INTRAVENOUS PRN
Status: DISCONTINUED | OUTPATIENT
Start: 2018-10-23 | End: 2018-10-23

## 2018-10-23 RX ORDER — ONDANSETRON 2 MG/ML
4 INJECTION INTRAMUSCULAR; INTRAVENOUS EVERY 30 MIN PRN
Status: DISCONTINUED | OUTPATIENT
Start: 2018-10-23 | End: 2018-10-24 | Stop reason: HOSPADM

## 2018-10-23 RX ORDER — MEPERIDINE HYDROCHLORIDE 25 MG/ML
12.5 INJECTION INTRAMUSCULAR; INTRAVENOUS; SUBCUTANEOUS
Status: DISCONTINUED | OUTPATIENT
Start: 2018-10-23 | End: 2018-10-24 | Stop reason: HOSPADM

## 2018-10-23 RX ORDER — ACETAMINOPHEN 325 MG/1
975 TABLET ORAL ONCE
Status: COMPLETED | OUTPATIENT
Start: 2018-10-23 | End: 2018-10-23

## 2018-10-23 RX ORDER — ACETAMINOPHEN 325 MG/1
650 TABLET ORAL
Status: DISCONTINUED | OUTPATIENT
Start: 2018-10-23 | End: 2018-10-24 | Stop reason: HOSPADM

## 2018-10-23 RX ORDER — NALOXONE HYDROCHLORIDE 0.4 MG/ML
.1-.4 INJECTION, SOLUTION INTRAMUSCULAR; INTRAVENOUS; SUBCUTANEOUS
Status: DISCONTINUED | OUTPATIENT
Start: 2018-10-23 | End: 2018-10-23 | Stop reason: HOSPADM

## 2018-10-23 RX ORDER — SODIUM CHLORIDE, SODIUM LACTATE, POTASSIUM CHLORIDE, CALCIUM CHLORIDE 600; 310; 30; 20 MG/100ML; MG/100ML; MG/100ML; MG/100ML
INJECTION, SOLUTION INTRAVENOUS CONTINUOUS
Status: DISCONTINUED | OUTPATIENT
Start: 2018-10-23 | End: 2018-10-24 | Stop reason: HOSPADM

## 2018-10-23 RX ORDER — LIDOCAINE HYDROCHLORIDE 10 MG/ML
INJECTION, SOLUTION INFILTRATION; PERINEURAL PRN
Status: DISCONTINUED | OUTPATIENT
Start: 2018-10-23 | End: 2018-10-23 | Stop reason: HOSPADM

## 2018-10-23 RX ADMIN — PROPOFOL 30 MG: 10 INJECTION, EMULSION INTRAVENOUS at 13:16

## 2018-10-23 RX ADMIN — PROPOFOL: 10 INJECTION, EMULSION INTRAVENOUS at 13:25

## 2018-10-23 RX ADMIN — ONDANSETRON 4 MG: 2 INJECTION INTRAMUSCULAR; INTRAVENOUS at 14:05

## 2018-10-23 RX ADMIN — PROPOFOL: 10 INJECTION, EMULSION INTRAVENOUS at 14:03

## 2018-10-23 RX ADMIN — PROPOFOL 40 MG: 10 INJECTION, EMULSION INTRAVENOUS at 13:08

## 2018-10-23 RX ADMIN — BUPIVACAINE HYDROCHLORIDE AND EPINEPHRINE 20 ML: 5; 5 INJECTION, SOLUTION PERINEURAL at 12:41

## 2018-10-23 RX ADMIN — CEFAZOLIN SODIUM 2 G: 2 SOLUTION INTRAVENOUS at 12:49

## 2018-10-23 RX ADMIN — KETOROLAC TROMETHAMINE 30 MG: 30 INJECTION, SOLUTION INTRAMUSCULAR; INTRAVENOUS at 14:37

## 2018-10-23 RX ADMIN — GABAPENTIN 300 MG: 300 CAPSULE ORAL at 11:39

## 2018-10-23 RX ADMIN — LIDOCAINE HYDROCHLORIDE 10 ML: 10 INJECTION, SOLUTION INFILTRATION; PERINEURAL at 14:55

## 2018-10-23 RX ADMIN — PROPOFOL 150 MCG/KG/MIN: 10 INJECTION, EMULSION INTRAVENOUS at 12:55

## 2018-10-23 RX ADMIN — PROPOFOL 40 MG: 10 INJECTION, EMULSION INTRAVENOUS at 12:55

## 2018-10-23 RX ADMIN — FENTANYL CITRATE 50 MCG: 50 INJECTION, SOLUTION INTRAMUSCULAR; INTRAVENOUS at 12:32

## 2018-10-23 RX ADMIN — ACETAMINOPHEN 975 MG: 325 TABLET ORAL at 11:39

## 2018-10-23 RX ADMIN — KETAMINE HYDROCHLORIDE 30 MG: 10 INJECTION, SOLUTION INTRAMUSCULAR; INTRAVENOUS at 13:24

## 2018-10-23 RX ADMIN — SODIUM CHLORIDE, SODIUM LACTATE, POTASSIUM CHLORIDE, CALCIUM CHLORIDE: 600; 310; 30; 20 INJECTION, SOLUTION INTRAVENOUS at 11:39

## 2018-10-23 NOTE — ANESTHESIA PROCEDURE NOTES
Peripheral Nerve Block Procedure Note    Staff:     Anesthesiologist:  ALEC MARCELINO    Resident/CRNA:  ANA RYAN    Block performed by resident/CRNA in the presence of a teaching physician    Location: Pre-op  Procedure Start/Stop TImes:      10/23/2018 12:40 PM     10/23/2018 1:48 PM    patient identified, IV checked, site marked, risks and benefits discussed, informed consent, monitors and equipment checked, pre-op evaluation, at physician/surgeon's request and post-op pain management      Correct Patient: Yes      Correct Position: Yes      Correct Site: Yes      Correct Procedure: Yes      Correct Laterality:  Yes    Site Marked:  Yes  Procedure details:     Procedure:  Supraclavicular    ASA:  1    Laterality:  Left    Position:  Sitting    Sterile Prep: chloraprep      Needle:  Insulated    Needle gauge:  21    Needle length (mm):  110    Ultrasound: Yes      Ultrasound used to identify targeted nerve, plexus, or vascular structure and placed a needle adjacent to it      Permanent Image entered into patiient's record      Abnormal pain on injection: No      Blood Aspirated: No      Paresthesias:  No    Bleeding at site: No      Test dose negative for signs of intravascular injection: Yes      Bolus via:  Needle    Infusion Method:  Single Shot    Complications:  None  Assessment/Narrative:     Injection made incrementally with aspirations every (mL):  5

## 2018-10-23 NOTE — ANESTHESIA CARE TRANSFER NOTE
Patient: Gela Miles    Procedure(s):  Open Reduction Internal Fixation Left Radial Shaft Fracture, Possible Triangular Fibrocartilage Complex Repair    Diagnosis: Left Radial Shaft Fracture, Possible Triangular Fibrocartilage Complex Tear  Diagnosis Additional Information: No value filed.    Anesthesia Type:   No value filed.     Note:  Airway :Room Air  Patient transferred to:Phase II  Handoff Report: Identifed the Patient, Identified the Reponsible Provider, Reviewed the pertinent medical history, Discussed the surgical course, Reviewed Intra-OP anesthesia mangement and issues during anesthesia, Set expectations for post-procedure period and Allowed opportunity for questions and acknowledgement of understanding      Vitals: (Last set prior to Anesthesia Care Transfer)    CRNA VITALS  10/23/2018 1439 - 10/23/2018 1512      10/23/2018             Pulse: 78    SpO2: 100 %                Electronically Signed By: MARTIN Scott CRNA  October 23, 2018  3:12 PM

## 2018-10-23 NOTE — DISCHARGE INSTRUCTIONS
"[facility]  Home Care Following Anesthesia  For 24 hours after surgery:  1. Get plenty of rest.  A responsible adult must stay with you for at least 24 hours after you leave the surgery center.  2. Do not drive or use heavy equipment.  If you have weakness or tingling, don't drive or use heavy equipment until this feeling goes away.   3. Do not drink alcohol.   4. Avoid strenuous or risky activities.  Ask for help when climbing stairs.  5. You may feel lightheaded.  IF so, sit for a few minutes before standing.  Have someone help you get up.   6. If you have nausea (feel sick to your stomach): Drink only clear liquids such as apple juice, ginger ale, broth or 7-Up.  Rest may also help.  Be sure to drink enough fluids.  Move to a regular diet as you feel able.   7. You may have a slight fever.  Call the doctor if your fever is over 100 F (37.7 C) (taken under the tongue) or lasts longer than 24 hours.  8. You may have a dry mouth, a sore throat, muscle aches or trouble sleeping. These should go away after 24 hours.  9. Do not make important or legal decisions.        Today you received an Exparel block to numb the nerves near your surgery site.  This is a block using local anesthetic or \"numbing\" medication injected around the nerves to anesthetize or \"numb\" the area supplied by those nerves.  This block is injected into the muscle layer near your surgical site.  This medication may numb the location where you had surgery up to 72 hours.  If your surgical site is an arm or leg you should be careful with your affected limb, since it is possible to injure your limb without being aware of it due to the numbing.  Until full feeling returns, you should guard against bumping or hitting your limb, and avoid extreme hot or cold temperatures on the skin.  As the block wears off, the feeling will return as a tingling or prickly sensation near your surgical site.  You will experince more " discomfort from your incision as the feeling returns.  You may want to take a pain pill (a narcotic or Tylenol if this was prescribed by your surgeon) when you start to experience mild pain before the pain beomes more severe.  If your pain medications do not control your pain, you should notify your surgeon.    Tips for taking pain medications  To get the best pain relief possible, remember these points:    Take pain medications as directed, before pain becomes severe.    Pain medication can upset your stomach: taking it with food may help.    Constipation is a common side effect of pain medication. Drink plenty of  fluids.    Eat foods high in fiber. Take a stool softener if recommended by your doctor or pharmacist.    Do not drink alcohol, drive or operate machinery while taking pain medications.    Ask about other ways to control pain, such as with heat, ice or relaxation.    Tylenol/Acetaminophen Consumption  To help encourage the safe use of acetaminophen, the makers of TYLENOL  have lowered the maximum daily dose for single-ingredient Extra Strength TYLENOL  (acetaminophen) products sold in the [place] from 8 pills per day (4,000 mg) to 6 pills per day (3,000 mg). The dosing interval has also changed from 2 pills every 4-6 hours to 2 pills every 6 hours.    If you feel your pain relief is insufficient, you may take Tylenol/Acetaminophen in addition to your narcotic pain medication.     Be careful not to exceed 3,000 mg of Tylenol/Acetaminophen in a 24 hour period from all sources.    If you are taking extra strength Tylenol/acetaminophen (500 mg), the maximum dose is 6 tablets in 24 hours.    If you are taking regular strength acetaminophen (325 mg), the maximum dose is 9 tablets in 24 hours.    Call a doctor for any of the followin. Signs of infection (fever, growing tenderness at the surgery site, a large amount of drainage or bleeding, severe pain, foul-smelling drainage, redness, swelling).  2. It has  been over 8 to 10 hours since surgery and you are still not able to urinate (pass water).  3. Headache for over 24 hours.    Your doctor is:       Dr. Barron Ybarra, Orthopaedics: [phone]               Or dial [phone] and ask for the resident on call for:  Orthopaedics  For emergency care, call the:  [facility] Emergency Department: [phone] (TTY for hearing impaired: [phone])  Post Operative Instructions: Regional Anesthetic for Upper Extremity    General Information:   Regional anesthesia is when local anesthetic or  numbing  medication is injected around the nerves to anesthetize or  numb  the area supplied by that set of nerves.      Types of Regional Blocks:  Interscalene: A block injected into the neck on the operative shoulder/arm of a patient having shoulder surgery  Supraclavicular: A block injected near the clavicle on the operative shoulder of a patient having elbow, forearm, or hand surgery    Procedure:  The type of anesthesia your doctor used to numb your shoulder or arm will usually not wear off for 6-18 hours, but may last as long as 24 hours. You should be careful during that period, since it is possible to injure your arm without being aware of the injury. While your arm is numb, you should:    Avoid striking or bumping your arm    Avoid extreme hot or cold    Diet:  There are no restrictions on your diet. You should drink plenty of fluids.     Discomfort:  You will have a tingling and prickly sensation in your arm as the feeling begins to return. You can also expect some discomfort. The amount of discomfort is unpredictable, but if you have more pain than can be controlled with pain medication you should notify your physician.     Pain Medicine:   Begin taking your oral pain pills (if you have not already done so) before bedtime and during the night to avoid a sudden onset of pain as the block wears off.  Do not engage in drinking, driving, or hazardous occupations while taking  pain medication.     Stitches:   You may have stitches or special skin closures. You doctor will inform you when to return to the office to have them removed.     Activity:  On the day of surgery you should try to stay in bed with your hand elevated on pillows. You may resume your normal activity after that, wearing a sling for comfort. Contact your physician if you have any of the problems:     Continued numbness or tingling in the arm or hand after 24 hours    Swelling of the fingers or fingers that are cold to the touch    Excessive bleeding or drainage    Severe pain

## 2018-10-23 NOTE — OP NOTE
PREOPERATIVE DIAGNOSIS:  Left Galeazzi fracture-dislocation.      POSTOPERATIVE DIAGNOSIS:  Left  Galeazzi fracture-dislocation.      PROCEDURE:  Open reduction internal fixation Left radial shaft fracture and triangular fibrocartilage complex (TFCC) repair     ATTENDING SURGEON:  Barron Ybarra MD      ASSISTANT:  Mayra Krishnan, PGY4     ANESTHESIA:  Monitored anesthesia care with regional block    TOURNIQUET TIME:   84 minutes     ESTIMATED BLOOD LOSS:  Minimal      SPECIMENS:  None.      DRAINS:  None.      IMPLANTS:  Synthes 7-hole 3.5 mm LC-DCP plate, Arthrex 2.5 mm mini Pushlock anchor     COMPLICATIONS:  None apparent.      BRIEF HISTORY:  Gela Miles is a 23 year old-year-old female who presented after having sustained a left Galeazzi fracture dislocation. We discussed the risks, benefits and alternatives to surgery. Risks discussed include bleeding, infection, nerve, tendon, or vessel damage, wound healing problems, complex regional pain syndrome, instability, persistent pain and/or stiffness, malunion, nonunion, postraumatic arthritis, hardware irritation, and the possibility for further surgery. The patient, expressed understanding and elected to proceed. Informed consent was obtained.    INTRAOPERATIVE FINDINGS:  DRUJ instability following repair of radial shaft fracture. TFCC torn from styloid and foveal insertions.     DESCRIPTION OF PROCEDURE:  The patient was identified in the preoperative holding area.  The informed consent was reviewed. The operative site was identified and marked. A regional block was performed by the anesthesiologist. The patient was then brought to the operating room and positioned with the operative extremity over a hand table. A tourniquet was placed around the upper arm. Preoperative prophylactic antibiotics were administered. The operative arm was prepped and draped in a standard sterile fashion.  A timeout was performed, verifying the correct patient, procedure to be  performed, and operative site. The arm was exsanguinated and the tourniquet was inflated to 250 mmHg. A longitudinal incision was made on the volar forearm centered over the fracture site and the fracture was accessed utilizing a Dandy approach. Dissection was taken down through the skin and subcutaneous tissues to the level of the forearm fascia. The fascia was opened and the radial artery was mobilized ulnarly. The superficial radial nerve was identified deep to brachioradialis and protected carefully. The interval between the radial artery and the superficial radial nerve was entered and the pronator quadratus and pronator teres were identified. The forearm was pronated and the pronator quadratus and pronator teres were released from their radial most insertions, exposing the radial shaft and fracture. The fracture was cleaned with a curette and rongeur. It was then reduced without difficulty. A 7-hole plate was selected and was pre-contoured with a gentle convex bend. It was then secured to the bone using non-locking screws. This gave us six cortices of fixation on each side of the fracture. Proximal screws were placed eccentrically to compress across the fracture site. Once the screws were placed multiple x-ray views were obtained confirming acceptable alignment, screw length and hardware position. The DRUJ was then stressed in neutral, pronation, and supination. There was increased translation in supination and no good endpoint so I elected to proceed with TFCC repair. A longitudinal incision was made overlying the distal ulna between the ECU and FCU. The dorsal cutaneous branch of the ulnar nerve was identified and protected. The extensor retinaculum was divided. The ulnocarpal joint capsule was identified and incised longitudinally, exposing the TFCC. The TFCC was torn. A 2-0 fiberwire suture was placed through the TFCC in horizontal mattress fashion. A hole was drilled in the ulna 1-2 cm proximal to the  styloid. The sutures were brought around both sides of the styloid and then loaded into a mini Pushlock anchor. The anchor was sunk into the hole while the sutures were tensioned. This brought the TFCC nicely down to the fovea. Suture ends were trimmed. The remaining 2-0 fiberwire suture was then used to repair the periphery of the TFCC to the capsule. The capsule was repaired with additional 2-0 fiberwire figure of eight sutures. The extensor retinaculum was repaired with 3-0 vicryl figure of eight sutures. The DRUJ was stressed in neutral, pronation, and supination and the excess translation noted previously was noted to have been eliminated. There was a good end point. The wounds were irrigated copiously. Tourniquet was taken down. Hemostasis was achieved. Skin was closed with 3-0 vicryl inverted interrupted sutures for the dermis followed by a 4-0 monocryl subcuticular stitch. A sterile dressing was applied of steristrips, 4x4s, and sterile cast padding. The patient was placed in a sugartong splint. The patient was then awoken and brought to the recovery room in stable condition. The patient tolerated the procedure well and there were no immediate complications. All sponge and needle counts were correct at the end of the case.      POSTOPERATIVE PLAN:  The patient will be discharged to home today with oral pain medications . The patient will return to clinic in 1 week to be placed in a muenster cast.

## 2018-10-23 NOTE — ANESTHESIA POSTPROCEDURE EVALUATION
Anesthesia POST Procedure Evaluation    Patient: Gela Miles   MRN:     [id number] Gender:   female   Age:    23 year old :      1995        Preoperative Diagnosis: Left Radial Shaft Fracture, Possible Triangular Fibrocartilage Complex Tear   Procedure(s):  Open Reduction Internal Fixation Left Radial Shaft Fracture, Possible Triangular Fibrocartilage Complex Repair   Postop Comments: No value filed.       Anesthesia Type:  Regional, MAC    Reportable Event: NO     PAIN: Uncomplicated   Sign Out status: Comfortable, Well controlled pain     PONV: No PONV   Sign Out status:  No Nausea or Vomiting     Neuro/Psych: Uneventful perioperative course   Sign Out Status: Preoperative baseline; Age appropriate mentation     Airway/Resp.: Uneventful perioperative course   Sign Out Status: Non labored breathing, age appropriate RR; Resp. Status within EXPECTED Parameters     CV: Uneventful perioperative course   Sign Out status: Appropriate BP and perfusion indices; Appropriate HR/Rhythm     Disposition:   Sign Out in:  Phase II  Disposition:  Home  Recovery Course: Uneventful  Follow-Up: Not required           Last Anesthesia Record Vitals:  CRNA VITALS  10/23/2018 1439 - 10/23/2018 1523      10/23/2018             Pulse: 78    SpO2: 100 %          Last PACU/Preop Vitals:  Vitals:    10/23/18 1235 10/23/18 1240 10/23/18 1512   BP: 121/66 116/70 116/68   Pulse: 69 70 76   Resp: 16 16 15   Temp:   37.2  C (98.9  F)   SpO2: 98% 97% 96%         Electronically Signed By: Jamie Mack MD, 2018, 3:23 PM

## 2018-10-23 NOTE — IP AVS SNAPSHOT
[facility]    [address] [place] [address]    Phone:  [phone]    Fax:  [phone]                                       After Visit Summary   10/23/2018    Gela Miles    MRN: [id number]           After Visit Summary Signature Page     I have received my discharge instructions, and my questions have been answered. I have discussed any challenges I see with this plan with the nurse or doctor.    ..........................................................................................................................................  Patient/Patient Representative Signature      ..........................................................................................................................................  Patient Representative Print Name and Relationship to Patient    ..................................................               ................................................  Date                                   Time    ..........................................................................................................................................  Reviewed by Signature/Title    ...................................................              ..............................................  Date                                               Time          22EPIC Rev 08/18

## 2018-10-23 NOTE — BRIEF OP NOTE
[facility]    Brief Operative Note    Pre-operative diagnosis: Left Radial Shaft Fracture, Possible Triangular Fibrocartilage Complex Tear  Post-operative diagnosis Left radial shaft fracture,TFCC repair   Procedure: Procedure(s):  Open Reduction Internal Fixation Left Radial Shaft Fracture, Possible Triangular Fibrocartilage Complex Repair  Surgeon: Surgeon(s) and Role:     * Barron Ybarra MD - Primary        Mayra Krishnan MD PGY4     Anesthesia: Combined MAC with Supraclavicular Block   Estimated blood loss: Less than 10 ml  Drains: None  Specimens: * No specimens in log *  Findings:   None.  Complications: None.  Implants: 7 hole 3.5 LCD synthes plate, 1 2.5 mm arthrex swivel lock     NWB, splint in supination   Follow up in 2 wks   Norco script given #25, OK to take IB profminerva Krishnan MD   PGY-4 Orthopaedic Surgery

## 2018-10-23 NOTE — IP AVS SNAPSHOT
MRN:[id number]                      After Visit Summary   10/23/2018    Gela Miles    MRN: [id number]           Thank you!     Thank you for choosing [facility] for your care. Our goal is always to provide you with excellent care. Hearing back from our patients is one way we can continue to improve our services. Please take a few minutes to complete the written survey that you may receive in the mail after you visit with us. Thank you!        Patient Information     Date Of Birth          1995        About your hospital stay     You were admitted on:  October 23, 2018 You last received care in the:  [facility]    You were discharged on:  October 23, 2018       Who to Call     For medical emergencies, please call 911.  For non-urgent questions about your medical care, please call your primary care provider or clinic, [phone]  For questions related to your surgery, please call your surgery clinic        Attending Provider     Provider Specialty    Barron Ybarra MD Orthopedics       Primary Care Provider Office Phone # Fax #    Niko Khanna [phone]      After Care Instructions     Discharge Instructions       Review outpatient procedure discharge instructions with patient as directed by Provider            Ice to affected area       Ice pack to surgical site every 15 minutes per hour for 24 hours            No Dressing Change       No dressing change until follow up appointment.            No weight bearing                 Your next 10 appointments already scheduled     Oct 31, 2018  1:00 PM CDT   (Arrive by 12:45 PM)   RETURN HAND with Barron Ybarra MD   [facility] ([facility])    [address] [place] [id number] [phone]              Further instructions from your care team       [facility]  Home Care Following  "Anesthesia  For 24 hours after surgery:  1. Get plenty of rest.  A responsible adult must stay with you for at least 24 hours after you leave the surgery center.  2. Do not drive or use heavy equipment.  If you have weakness or tingling, don't drive or use heavy equipment until this feeling goes away.   3. Do not drink alcohol.   4. Avoid strenuous or risky activities.  Ask for help when climbing stairs.  5. You may feel lightheaded.  IF so, sit for a few minutes before standing.  Have someone help you get up.   6. If you have nausea (feel sick to your stomach): Drink only clear liquids such as apple juice, ginger ale, broth or 7-Up.  Rest may also help.  Be sure to drink enough fluids.  Move to a regular diet as you feel able.   7. You may have a slight fever.  Call the doctor if your fever is over 100 F (37.7 C) (taken under the tongue) or lasts longer than 24 hours.  8. You may have a dry mouth, a sore throat, muscle aches or trouble sleeping. These should go away after 24 hours.  9. Do not make important or legal decisions.        Today you received an Exparel block to numb the nerves near your surgery site.  This is a block using local anesthetic or \"numbing\" medication injected around the nerves to anesthetize or \"numb\" the area supplied by those nerves.  This block is injected into the muscle layer near your surgical site.  This medication may numb the location where you had surgery up to 72 hours.  If your surgical site is an arm or leg you should be careful with your affected limb, since it is possible to injure your limb without being aware of it due to the numbing.  Until full feeling returns, you should guard against bumping or hitting your limb, and avoid extreme hot or cold temperatures on the skin.  As the block wears off, the feeling will return as a tingling or prickly sensation near your surgical site.  You will experince more discomfort from your incision as the feeling returns.  You may want to " take a pain pill (a narcotic or Tylenol if this was prescribed by your surgeon) when you start to experience mild pain before the pain beomes more severe.  If your pain medications do not control your pain, you should notify your surgeon.    Tips for taking pain medications  To get the best pain relief possible, remember these points:    Take pain medications as directed, before pain becomes severe.    Pain medication can upset your stomach: taking it with food may help.    Constipation is a common side effect of pain medication. Drink plenty of  fluids.    Eat foods high in fiber. Take a stool softener if recommended by your doctor or pharmacist.    Do not drink alcohol, drive or operate machinery while taking pain medications.    Ask about other ways to control pain, such as with heat, ice or relaxation.    Tylenol/Acetaminophen Consumption  To help encourage the safe use of acetaminophen, the makers of TYLENOL  have lowered the maximum daily dose for single-ingredient Extra Strength TYLENOL  (acetaminophen) products sold in the [place] from 8 pills per day (4,000 mg) to 6 pills per day (3,000 mg). The dosing interval has also changed from 2 pills every 4-6 hours to 2 pills every 6 hours.    If you feel your pain relief is insufficient, you may take Tylenol/Acetaminophen in addition to your narcotic pain medication.     Be careful not to exceed 3,000 mg of Tylenol/Acetaminophen in a 24 hour period from all sources.    If you are taking extra strength Tylenol/acetaminophen (500 mg), the maximum dose is 6 tablets in 24 hours.    If you are taking regular strength acetaminophen (325 mg), the maximum dose is 9 tablets in 24 hours.    Call a doctor for any of the followin. Signs of infection (fever, growing tenderness at the surgery site, a large amount of drainage or bleeding, severe pain, foul-smelling drainage, redness, swelling).  2. It has been over 8 to 10 hours since surgery and you are still not able to  urinate (pass water).  3. Headache for over 24 hours.    Your doctor is:       Dr. Barron Ybarra, Orthopaedics: [phone]               Or dial [phone] and ask for the resident on call for:  Orthopaedics  For emergency care, call the:  [facility] Emergency Department: [phone] (TTY for hearing impaired: [phone])  Post Operative Instructions: Regional Anesthetic for Upper Extremity    General Information:   Regional anesthesia is when local anesthetic or  numbing  medication is injected around the nerves to anesthetize or  numb  the area supplied by that set of nerves.      Types of Regional Blocks:  Interscalene: A block injected into the neck on the operative shoulder/arm of a patient having shoulder surgery  Supraclavicular: A block injected near the clavicle on the operative shoulder of a patient having elbow, forearm, or hand surgery    Procedure:  The type of anesthesia your doctor used to numb your shoulder or arm will usually not wear off for 6-18 hours, but may last as long as 24 hours. You should be careful during that period, since it is possible to injure your arm without being aware of the injury. While your arm is numb, you should:    Avoid striking or bumping your arm    Avoid extreme hot or cold    Diet:  There are no restrictions on your diet. You should drink plenty of fluids.     Discomfort:  You will have a tingling and prickly sensation in your arm as the feeling begins to return. You can also expect some discomfort. The amount of discomfort is unpredictable, but if you have more pain than can be controlled with pain medication you should notify your physician.     Pain Medicine:   Begin taking your oral pain pills (if you have not already done so) before bedtime and during the night to avoid a sudden onset of pain as the block wears off.  Do not engage in drinking, driving, or hazardous occupations while taking pain medication.     Stitches:   You may have stitches or special  "skin closures. You doctor will inform you when to return to the office to have them removed.     Activity:  On the day of surgery you should try to stay in bed with your hand elevated on pillows. You may resume your normal activity after that, wearing a sling for comfort. Contact your physician if you have any of the problems:     Continued numbness or tingling in the arm or hand after 24 hours    Swelling of the fingers or fingers that are cold to the touch    Excessive bleeding or drainage    Severe pain        Pending Results     No orders found from 10/21/2018 to 10/24/2018.            Admission Information     Date & Time Provider Department Dept. Phone    10/23/2018 Barron Ybarra MD [facility] [phone]      Your Vitals Were     Blood Pressure Pulse Temperature Respirations Height Weight    117/72 78 98.9  F (37.2  C) (Temporal) 14 1.77 m (5' 9.69\") 86.5 kg (190 lb 9.6 oz)    Last Period Pulse Oximetry BMI (Body Mass Index)             10/05/2018 (Exact Date) 96% 27.59 kg/m2         [facility] Information     [employer]ince[employer] is an electronic gateway that provides easy, online access to your medical records. With [employer], you can request a clinic appointment, read your test results, renew a prescription or communicate with your care team.     To sign up for [employer] visit the website at [email or web address]   You will be asked to enter the access code listed below, as well as some personal information. Please follow the directions to create your username and password.     Your access code is: [email or web address]  Expires: 2019  7:35 PM     Your access code will  in 90 days. If you need help or a new code, please contact your [facility] or call [phone] for assistance.        Care EveryWhere ID     This is your Care EveryWhere ID. This could be used by other organizations to access your [place] medical records  [id number]        Equal " Access to Services     [facility]: Hadii aad ku hadelisabethmarybeth Lylynadine, waaxda luqadaha, qaybta kaalmaryan olivarez, miya barreto. So [facility] [phone].    ATENCIÓN: Si habla espshila, tiene a heart disposición servicios gratuitos de asistencia lingüística. Llame al [phone].    We comply with applicable federal civil rights laws and [place] laws. We do not discriminate on the basis of race, color, national origin, age, disability, sex, sexual orientation, or gender identity.               Review of your medicines      START taking        Dose / Directions    HYDROcodone-acetaminophen 5-325 MG per tablet   Commonly known as:  NORCO   Used for:  Closed displaced oblique fracture of shaft of radius, unspecified laterality, initial encounter        Dose:  1-2 tablet   Take 1-2 tablets by mouth every 4 hours as needed for moderate to severe pain   Quantity:  25 tablet   Refills:  0       senna-docusate 8.6-50 MG per tablet   Commonly known as:  SENOKOT-S;PERICOLACE   Used for:  Closed displaced oblique fracture of shaft of radius, unspecified laterality, initial encounter        Dose:  1-2 tablet   Take 1-2 tablets by mouth 2 times daily To prevent constipation while on opiates   Quantity:  30 tablet   Refills:  0         CONTINUE these medicines which may have CHANGED, or have new prescriptions. If we are uncertain of the size of tablets/capsules you have at home, strength may be listed as something that might have changed.        Dose / Directions    * ibuprofen 800 MG tablet   Commonly known as:  ADVIL/MOTRIN   This may have changed:  Another medication with the same name was added. Make sure you understand how and when to take each.        Dose:  800 mg   Take 1 tablet (800 mg) by mouth every 8 hours as needed for moderate pain   Quantity:  20 tablet   Refills:  0       * ibuprofen 600 MG tablet   Commonly known as:  ADVIL/MOTRIN   This may have changed:  You were already taking a  medication with the same name, and this prescription was added. Make sure you understand how and when to take each.   Used for:  Closed displaced oblique fracture of shaft of radius, unspecified laterality, initial encounter        Dose:  600 mg   Take 1 tablet (600 mg) by mouth every 6 hours as needed for other (mild and/or inflammatory pain)   Quantity:  30 tablet   Refills:  0       * Notice:  This list has 2 medication(s) that are the same as other medications prescribed for you. Read the directions carefully, and ask your doctor or other care provider to review them with you.      CONTINUE these medicines which have NOT CHANGED        Dose / Directions    levonorgestrel 20 MCG/24HR IUD   Commonly known as:  MIRENA        Dose:  1 each   1 each by Intrauterine route once   Refills:  0       oxyCODONE IR 5 MG tablet   Commonly known as:  ROXICODONE        Dose:  5 mg   Take 1 tablet (5 mg) by mouth every 6 hours as needed for pain   Quantity:  12 tablet   Refills:  0       oxyCODONE-acetaminophen 5-325 MG per tablet   Commonly known as:  PERCOCET        Dose:  1-2 tablet   Take 1-2 tablets by mouth every 4 hours as needed for pain   Quantity:  12 tablet   Refills:  0            Where to get your medicines      These medications were sent to [address] [address]    Hours:  TRANSPLANT PHONE NUMBER [phone] Phone:  [phone]     ibuprofen 600 MG tablet    senna-docusate 8.6-50 MG per tablet         Some of these will need a paper prescription and others can be bought over the counter. Ask your nurse if you have questions.     Bring a paper prescription for each of these medications     HYDROcodone-acetaminophen 5-325 MG per tablet                Protect others around you: Learn how to safely use, store and throw away your medicines at www.disposemymeds.org.        Information about your nerve block   "   Today you received a block to numb the nerves near your surgery site.    This is a block using local anesthetic or \"numbing\" medication injected around the nerves to anesthetize or \"numb\" the area supplied by those nerves. This block is injected into the muscle layer near your surgical site. The type of anesthesia (Exparel) your anesthesia team used to numb your abdomen may give you relief for up to 72 hours.     Diet: There are no diet restrictions, but you should drink plenty of fluids, unless you are on a fluid-restricted diet.     Activity: If your surgical site is an arm or leg you should be careful with your affected limb, since it is possible to injure your limb without being aware of it due to the numbing. Until full feeling returns, you should guard against bumping or hitting your limb, and avoid extreme hot or cold temperatures on the skin.    Pain Medication: As the block wears off, the feeling will return as a tingling or prickly sensation near your surgical site. You will experience more discomfort from your incisions as the feeling returns. You may want to take a pain pill (a narcotic or Tylenol if this was prescribed by your surgeon) when you start to experience mild pain, before the pain becomes more severe. If your pain medications do not control your pain, you should notify your surgeon. If you are taking narcotics for pain management, do not drink alcohol, drive a car, or perform hazardous activities.  If you have questions or concerns you may call your surgeon at the number provided with your discharge instructions.     Call your surgeon if you experience blurry vision, ringing in the ears or metallic taste in your mouth.         Information about OPIOIDS     PRESCRIPTION OPIOIDS: WHAT YOU NEED TO KNOW   We gave you an opioid (narcotic) pain medicine. It is important to manage your pain, but opioids are not always the best choice. You should first try all the other options your care team gave " you. Take this medicine for as short a time (and as few doses) as possible.    Some activities can increase your pain, such as bandage changes or therapy sessions. It may help to take your pain medicine 30 to 60 minutes before these activities. Reduce your stress by getting enough sleep, working on hobbies you enjoy and practicing relaxation or meditation. Talk to your care team about ways to manage your pain beyond prescription opioids.    These medicines have risks:    DO NOT drive when on new or higher doses of pain medicine. These medicines can affect your alertness and reaction times, and you could be arrested for driving under the influence (DUI). If you need to use opioids long-term, talk to your care team about driving.    DO NOT operate heavy machinery    DO NOT do any other dangerous activities while taking these medicines.    DO NOT drink any alcohol while taking these medicines.     If the opioid prescribed includes acetaminophen, DO NOT take with any other medicines that contain acetaminophen. Read all labels carefully. Look for the word  acetaminophen  or  Tylenol.  Ask your pharmacist if you have questions or are unsure.    You can get addicted to pain medicines, especially if you have a history of addiction (chemical, alcohol or substance dependence). Talk to your care team about ways to reduce this risk.    All opioids tend to cause constipation. Drink plenty of water and eat foods that have a lot of fiber, such as fruits, vegetables, prune juice, apple juice and high-fiber cereal. Take a laxative (Miralax, milk of magnesia, Colace, Senna) if you don t move your bowels at least every other day. Other side effects include upset stomach, sleepiness, dizziness, throwing up, tolerance (needing more of the medicine to have the same effect), physical dependence and slowed breathing.    Store your pills in a secure place, locked if possible. We will not replace any lost or stolen medicine. If you don t  finish your medicine, please throw away (dispose) as directed by your pharmacist. The [place] Pollution Control Agency has more information about safe disposal: https://www.pca.[facility].mn.us/living-green/managing-unwanted-medications             Medication List: This is a list of all your medications and when to take them. Check marks below indicate your daily home schedule. Keep this list as a reference.      Medications           Morning Afternoon Evening Bedtime As Needed    HYDROcodone-acetaminophen 5-325 MG per tablet   Commonly known as:  NORCO   Take 1-2 tablets by mouth every 4 hours as needed for moderate to severe pain                                * ibuprofen 800 MG tablet   Commonly known as:  ADVIL/MOTRIN   Take 1 tablet (800 mg) by mouth every 8 hours as needed for moderate pain                                * ibuprofen 600 MG tablet   Commonly known as:  ADVIL/MOTRIN   Take 1 tablet (600 mg) by mouth every 6 hours as needed for other (mild and/or inflammatory pain)                                levonorgestrel 20 MCG/24HR IUD   Commonly known as:  MIRENA   1 each by Intrauterine route once                                oxyCODONE IR 5 MG tablet   Commonly known as:  ROXICODONE   Take 1 tablet (5 mg) by mouth every 6 hours as needed for pain                                oxyCODONE-acetaminophen 5-325 MG per tablet   Commonly known as:  PERCOCET   Take 1-2 tablets by mouth every 4 hours as needed for pain                                senna-docusate 8.6-50 MG per tablet   Commonly known as:  SENOKOT-S;PERICOLACE   Take 1-2 tablets by mouth 2 times daily To prevent constipation while on opiates                                * Notice:  This list has 2 medication(s) that are the same as other medications prescribed for you. Read the directions carefully, and ask your doctor or other care provider to review them with you.

## 2018-10-24 ENCOUNTER — TELEPHONE (OUTPATIENT)
Dept: ORTHOPEDICS | Facility: CLINIC | Age: 23
End: 2018-10-24

## 2018-10-24 NOTE — TELEPHONE ENCOUNTER
[facility] Call Center    Phone Message    May a detailed message be left on voicemail: no    Reason for Call: Other: Pt of Dr. Ybarra has a POP appt on 10/31 and can't make it and I don't have anything to offer pt until Nov 19th, please call pt back     Action Taken: Message routed to:  [facility] ([facility]): Ortho

## 2018-10-29 NOTE — TELEPHONE ENCOUNTER
[facility] Call Center    Phone Message    May a detailed message be left on voicemail: yes    Reason for Call: Other: Pt still awaiting call for Appt. Please call ASAP, as appt is this Wednesday, and pt needs to change time.     Action Taken: Message routed to:  [facility] ([facility]): Orthopedics

## 2018-10-29 NOTE — TELEPHONE ENCOUNTER
Returned patient's call.  She was rescheduled to 12:30pm on 10/31/18.  She has our phone number for any further questions or concerns.

## 2018-10-30 NOTE — PROGRESS NOTES
[facility]  Orthopedics  Barron Ybarra MD  10/31/2018     Name: Gela Miles  MRN: [id number]  Age: 23 year old  : 1995  Referring provider: Barron Ybarra     Chief Complaint:   Surgical Followup (follow up on left fore arm surgery on 10/23/2018)       Date of Surgery: 10/23/18    Procedure: ORIF left radial shaft    History of Present Illness:   Gela Miles is a 23 year old female 1 week status-post the above mentioned procedure who presents for postoperative evaluation. Today, she reports that her pain has improved over the last week. Almost none now. She has no other concerns today. Denies numbness and tingling.     Physical Examination:  LMP 10/05/2018 (Exact Date)  General: Healthy appearing female. Affect appropriate. Normal gait. Alert and oriented to surroundings.   On examination of the Left Upper Extremity:   Surgical incisions well approximated and healing well without evidence of induration, erythema, or drainage. Interosseous muscles, EPL, and FDP-2 fire 5/5. Fingers are warm and well-perfused. Sensation intact in the median, radial, and ulnar nerve distributions. Palpable radial pulse.     Radiographs:   Radiographs of the left  - forearm views (10/31/2018):  Plate fixation of radial shaft fracture with improved alignment as well as evidence of bone anchor in distal ulna.     I have independently reviewed the above imaging studies; the results were discussed with the patient.     Assessment:   23 year old female s/p ORIF left radial shaft and TFCC repair. Progressing appropriately.     Plan:   Patient will be placed in a muenster cast today in supination. I discussed with the patient that she should keep her cast dry and continue to move her fingers as much as possible. No lifting > weight of coffee cup. She will be placed in a removable brace after our next visit. I will plan on seeing her again for follow-up in 3 weeks.     Barron Ybarra MD      Scribe Disclosure:   Saleem EL,  am serving as a scribe to document services personally performed by Barron Ybarra MD at this visit, based upon the provider's statements to me. All documentation has been reviewed by the aforementioned provider prior to being entered into the official medical record.

## 2018-10-31 ENCOUNTER — RADIANT APPOINTMENT (OUTPATIENT)
Dept: GENERAL RADIOLOGY | Facility: CLINIC | Age: 23
End: 2018-10-31
Attending: ORTHOPAEDIC SURGERY
Payer: COMMERCIAL

## 2018-10-31 ENCOUNTER — OFFICE VISIT (OUTPATIENT)
Dept: ORTHOPEDICS | Facility: CLINIC | Age: 23
End: 2018-10-31
Payer: COMMERCIAL

## 2018-10-31 DIAGNOSIS — S52.372A: ICD-10-CM

## 2018-10-31 DIAGNOSIS — S52.372D CLOSED GALEAZZI'S FRACTURE OF LEFT RADIUS WITH ROUTINE HEALING, SUBSEQUENT ENCOUNTER: Primary | ICD-10-CM

## 2018-10-31 DIAGNOSIS — S52.372A: Primary | ICD-10-CM

## 2018-10-31 ASSESSMENT — PAIN SCALES - GENERAL: PAINLEVEL: NO PAIN (0)

## 2018-10-31 NOTE — LETTER
10/31/2018       RE: Gela Miles  [address]  [facility] [address]     Dear Colleague,    Thank you for referring your patient, Gela Miles, to the [facility]TH[facility]IC at [facility]. Please see a copy of my visit note below.    [facility]  Orthopedics  Barron Ybarra MD  10/31/2018     Name: Gela Miles  MRN: [id number]  Age: 23 year old  : 1995  Referring provider: Barron Ybarra     Chief Complaint:   Surgical Followup (follow up on left fore arm surgery on 10/23/2018)       Date of Surgery: 10/23/18    Procedure: ORIF left radial shaft    History of Present Illness:   Gela Miles is a 23 year old female 1 week status-post the above mentioned procedure who presents for postoperative evaluation. Today, she reports that her pain has improved over the last week. Almost none now. She has no other concerns today. Denies numbness and tingling.     Physical Examination:  LMP 10/05/2018 (Exact Date)  General: Healthy appearing female. Affect appropriate. Normal gait. Alert and oriented to surroundings.   On examination of the Left Upper Extremity:   Surgical incisions well approximated and healing well without evidence of induration, erythema, or drainage. Interosseous muscles, EPL, and FDP-2 fire 5/5. Fingers are warm and well-perfused. Sensation intact in the median, radial, and ulnar nerve distributions. Palpable radial pulse.     Radiographs:   Radiographs of the left  - forearm views (10/31/2018):  Plate fixation of radial shaft fracture with improved alignment as well as evidence of bone anchor in distal ulna.     I have independently reviewed the above imaging studies; the results were discussed with the patient.     Assessment:   23 year old female s/p ORIF left radial shaft and TFCC repair. Progressing appropriately.     Plan:   Patient will be placed in a muenster cast today in supination. I discussed with the patient that she should keep her cast  dry and continue to move her fingers as much as possible. No lifting > weight of coffee cup. She will be placed in a removable brace after our next visit. I will plan on seeing her again for follow-up in 3 weeks.     Barron Ybarra MD      Scribe Disclosure:   I, Saleem César, am serving as a scribe to document services personally performed by Barron Ybarra MD at this visit, based upon the provider's statements to me. All documentation has been reviewed by the aforementioned provider prior to being entered into the official medical record.

## 2018-10-31 NOTE — MR AVS SNAPSHOT
After Visit Summary   10/31/2018    Gela Miles    MRN: [id number]           Patient Information     Date Of Birth          1995        Visit Information        Provider Department      10/31/2018 12:30 PM Barron Ybarra MD [facility] Orthopaedic Clinic        Today's Diagnoses     Closed Galeazzi's fracture of left radius with routine healing, subsequent encounter    -  1       Follow-ups after your visit        Your next 10 appointments already scheduled     2018  9:00 AM CST   (Arrive by 8:45 AM)   TEODORA Hand with Tressa Eastman OT   [facility] ([facility])    [address] [place] [address] [phone]              Future tests that were ordered for you today     Open Future Orders        Priority Expected Expires Ordered    XR Wrist Left G/E 3 Views Routine 10/31/2018 10/31/2019 10/31/2018            Who to contact     Please call your clinic at [phone] to:    Ask questions about your health    Make or cancel appointments    Discuss your medicines    Learn about your test results    Speak to your doctor            Additional Information About Your Visit        [employer] Information     [employer] is an electronic gateway that provides easy, online access to your medical records. With [employer], you can request a clinic appointment, read your test results, renew a prescription or communicate with your care team.     To sign up for [employer] visit the website at [email or web address]   You will be asked to enter the access code listed below, as well as some personal information. Please follow the directions to create your username and password.     Your access code is: [email or web address]  Expires: 2019  7:35 PM     Your access code will  in 90 days. If you need help or a new code, please contact your [facility] or call [phone] for assistance.        Care EveryWhere ID     This is  your Care EveryWhere ID. This could be used by other organizations to access your [place] medical records  [id number]        Your Vitals Were     Last Period                   10/05/2018 (Exact Date)            Blood Pressure from Last 3 Encounters:   10/23/18 119/68   10/19/18 137/89    Weight from Last 3 Encounters:   10/23/18 86.5 kg (190 lb 9.6 oz)   10/22/18 86.4 kg (190 lb 6.4 oz)   10/19/18 85.7 kg (189 lb)              We Performed the Following     APPLY LONG ARM CAST     Long Arm Cast, Adult (11 Years Or Older), Fiberglass          Today's Medication Changes          These changes are accurate as of 10/31/18  1:13 PM.  If you have any questions, ask your nurse or doctor.               Stop taking these medicines if you haven't already. Please contact your care team if you have questions.     oxyCODONE IR 5 MG tablet   Commonly known as:  ROXICODONE   Stopped by:  Barron Ybarra MD                    Primary Care Provider Office Phone # Fax #    Niko Khanna [phone]       [address] [address]        Equal Access to Services     [facility]: Hadii giselle ku hadelisabetho Sonadine, waaxda luqadaha, qaybta kaalmada adebandaryada, miya brewster . So [facility] [phone].    ATENCIÓN: Si habla español, tiene a heart disposición servicios gratuitos de asistencia lingüística. Llame al [phone].    We comply with applicable federal civil rights laws and [place] laws. We do not discriminate on the basis of race, color, national origin, age, disability, sex, sexual orientation, or gender identity.            Thank you!     Thank you for choosing [facility]  for your care. Our goal is always to provide you with excellent care. Hearing back from our patients is one way we can continue to improve our services. Please take a few minutes to complete the written survey that you may receive in the mail after your visit with  us. Thank you!             Your Updated Medication List - Protect others around you: Learn how to safely use, store and throw away your medicines at www.disposemymeds.org.          This list is accurate as of 10/31/18  1:13 PM.  Always use your most recent med list.                   Brand Name Dispense Instructions for use Diagnosis    HYDROcodone-acetaminophen 5-325 MG per tablet    NORCO    25 tablet    Take 1-2 tablets by mouth every 4 hours as needed for moderate to severe pain    Closed displaced oblique fracture of shaft of radius, unspecified laterality, initial encounter       ibuprofen 600 MG tablet    ADVIL/MOTRIN    30 tablet    Take 1 tablet (600 mg) by mouth every 6 hours as needed for other (mild and/or inflammatory pain)    Closed displaced oblique fracture of shaft of radius, unspecified laterality, initial encounter       levonorgestrel 20 MCG/24HR IUD    MIRENA     1 each by Intrauterine route once        oxyCODONE-acetaminophen 5-325 MG per tablet    PERCOCET    12 tablet    Take 1-2 tablets by mouth every 4 hours as needed for pain        senna-docusate 8.6-50 MG per tablet    SENOKOT-S;PERICOLACE    30 tablet    Take 1-2 tablets by mouth 2 times daily To prevent constipation while on opiates    Closed displaced oblique fracture of shaft of radius, unspecified laterality, initial encounter

## 2018-10-31 NOTE — NURSING NOTE
Reason For Visit:   Chief Complaint   Patient presents with     Surgical Followup     follow up on left fore arm surgery on 10/23/2018       Primary MD: Niko Khanna  Ref. MD: PAU    Age: 23 year old    ?  No      LMP 10/05/2018 (Exact Date)      Pain Assessment  Patient Currently in Pain: No  0-10 Pain Scale: 0               QuickDASH Assessment  QuickDASH Main 10/22/2018   1.Open a tight or new jar. Unable   2. Do heavy household chores (e.g., wash walls, floors) Unable   3. Carry a shopping bag or briefcase. Moderate difficulty   4. Wash your back. Unable   5. Use a knife to cut food. Moderate difficulty   6. Recreational activities in which you take some force or impact through your arm, shoulder or hand (e.g., golf, hammering, tennis, etc.). Unable   7. During the past week, to what extent has your arm, shoulder or hand problem interfered with your normal social activities with family, friends, neighbours or groups? Extremely   8. During the past week, were you limited in your work or other regular daily activities as a result of your arm, shoulder or hand problem? Unable   9. Arm, shoulder or hand pain. Moderate   10.Tingling (pins and needles) in your arm,shoulder or hand. None   11. During the past week, how much difficulty have you had sleeping because of the pain in your arm, shoulder or hand? ([place] number) Moderate difficulty   Quickdash Ability Score 72.72          Current Outpatient Prescriptions   Medication Sig Dispense Refill     HYDROcodone-acetaminophen (NORCO) 5-325 MG per tablet Take 1-2 tablets by mouth every 4 hours as needed for moderate to severe pain 25 tablet 0     ibuprofen (ADVIL/MOTRIN) 600 MG tablet Take 1 tablet (600 mg) by mouth every 6 hours as needed for other (mild and/or inflammatory pain) 30 tablet 0     levonorgestrel (MIRENA) 20 MCG/24HR IUD 1 each by Intrauterine route once       oxyCODONE-acetaminophen (PERCOCET) 5-325 MG per tablet Take 1-2 tablets by mouth every  4 hours as needed for pain 12 tablet 0     senna-docusate (SENOKOT-S;PERICOLACE) 8.6-50 MG per tablet Take 1-2 tablets by mouth 2 times daily To prevent constipation while on opiates 30 tablet 0       No Known Allergies    Aylin Hawk, CMA

## 2018-10-31 NOTE — NURSING NOTE
Patient is here for post op follow up on her left fore arm, she states that she havent been having much pain at all. Not too much swelling.

## 2018-11-20 DIAGNOSIS — S52.372D: Primary | ICD-10-CM

## 2018-11-21 ENCOUNTER — RADIANT APPOINTMENT (OUTPATIENT)
Dept: GENERAL RADIOLOGY | Facility: CLINIC | Age: 23
End: 2018-11-21
Attending: ORTHOPAEDIC SURGERY
Payer: COMMERCIAL

## 2018-11-21 ENCOUNTER — OFFICE VISIT (OUTPATIENT)
Dept: ORTHOPEDICS | Facility: CLINIC | Age: 23
End: 2018-11-21
Payer: COMMERCIAL

## 2018-11-21 ENCOUNTER — THERAPY VISIT (OUTPATIENT)
Dept: OCCUPATIONAL THERAPY | Facility: CLINIC | Age: 23
End: 2018-11-21
Payer: COMMERCIAL

## 2018-11-21 VITALS — BODY MASS INDEX: 27.2 KG/M2 | WEIGHT: 190 LBS | HEIGHT: 70 IN

## 2018-11-21 DIAGNOSIS — Z47.89 AFTERCARE FOLLOWING SURGERY OF THE MUSCULOSKELETAL SYSTEM: ICD-10-CM

## 2018-11-21 DIAGNOSIS — S52.509A CLOSED FRACTURE OF DISTAL END OF RADIUS, UNSPECIFIED FRACTURE MORPHOLOGY, UNSPECIFIED LATERALITY, INITIAL ENCOUNTER: ICD-10-CM

## 2018-11-21 DIAGNOSIS — M25.532 LEFT WRIST PAIN: Primary | ICD-10-CM

## 2018-11-21 DIAGNOSIS — S52.509A CLOSED FRACTURE OF DISTAL END OF RADIUS, UNSPECIFIED FRACTURE MORPHOLOGY, UNSPECIFIED LATERALITY, INITIAL ENCOUNTER: Primary | ICD-10-CM

## 2018-11-21 DIAGNOSIS — S52.372D: ICD-10-CM

## 2018-11-21 DIAGNOSIS — S52.302A: ICD-10-CM

## 2018-11-21 PROCEDURE — 97110 THERAPEUTIC EXERCISES: CPT | Mod: GO | Performed by: OCCUPATIONAL THERAPIST

## 2018-11-21 PROCEDURE — 97165 OT EVAL LOW COMPLEX 30 MIN: CPT | Mod: GO | Performed by: OCCUPATIONAL THERAPIST

## 2018-11-21 PROCEDURE — 97760 ORTHOTIC MGMT&TRAING 1ST ENC: CPT | Mod: GO | Performed by: OCCUPATIONAL THERAPIST

## 2018-11-21 NOTE — LETTER
11/21/2018       RE: Gela Miles  [address]  [facility] [address]     Dear Colleague,    Thank you for referring your patient, Gela Miles, [facility] the [facility]. Please see a copy of my visit note below.    Hand surgery postop note    I saw the patient follow up today. She is status post open reduction internal fixation left radial shaft fracture and TFCC repair on October 23. She reports she is having minimal discomfort in the wrist or forearm. She's been in the cast with no issues with it. There is no numbness or tingling. She is back to school. There are no other concerns at this time.    On examination of the left forearm, incisions well-healed. There is no erythema or drainage. There is no fracture site tenderness. There is no swelling. Sensation is intact in the median, radial, and ulnar nerve distributions. She is able to flex and extend all digits and thumb.    X-rays were reviewed today demonstrate no change in hardware or bony alignment. Radial shaft fracture is still visualized.    Patient is progressing appropriately. She'll see hand therapy today and be fitted for custom [place] splint. She may begin active range of motion exercises of the wrist and forearm. I would like her to remain in the splint at all times other than showers and exercises. I will see her back in follow-up in 4 weeks. Anticipate likely discontinuation of splint at that time.    Again, thank you for allowing me to participate in the care of your patient.      Sincerely,    Barron Ybarra MD
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
## 2018-11-21 NOTE — MR AVS SNAPSHOT
After Visit Summary   11/21/2018    Gela Miles    MRN: [id number]           Patient Information     Date Of Birth          1995        Visit Information        Provider Department      11/21/2018 9:00 AM Rachel Lauren OT M [facility]        Today's Diagnoses     Left wrist pain    -  1    Aftercare following surgery of the musculoskeletal system        Fracture of shaft of left radius           Follow-ups after your visit        Your next 10 appointments already scheduled     Nov 30, 2018  4:00 PM CST   TEODORA Hand with NIALL Burrell [facility] ([facility])    [address] [place] [address]   [phone]            Dec 07, 2018 12:00 PM CST   TEODORA Hand with NIALL Burrell [facility] ([facility])    [address] [place] [address] [phone]            Dec 14, 2018 11:30 AM CST   TEODORA Hand with Calli Mullins OT   [facility] ([facility])    [address] [place] [address] [phone]            Dec 17, 2018  9:15 AM CST   (Arrive by 9:00 AM)   RETURN HAND with Barron Ybarra MD   [facility] ([facility])    [address] [place] [address] [phone]              Future tests that were ordered for you today     Open Future Orders        Priority Expected Expires Ordered    HAND THERAPY Occupational Therapy or Physical Therapy Routine  11/21/2019 11/21/2018            Who to contact     If you have questions or need follow up information about today's clinic visit or your schedule please contact [facility] directly at [phone].  Normal or non-critical lab and imaging results will be communicated to you by MyChart, letter or phone within 4 business days after the clinic has received the results. If  "you do not hear from us within 7 days, please contact the clinic through [employer] or phone. If you have a critical or abnormal lab result, we will notify you by phone as soon as possible.  Submit refill requests through [employer] or call your pharmacy and they will forward the refill request to us. Please allow 3 business days for your refill to be completed.          Additional Information About Your Visit        [employer] Information     [employer] lets you send messages to your doctor, view your test results, renew your prescriptions, schedule appointments and more. To sign up, go to [email or web address] . Click on \"Log in\" on the left side of the screen, which will take you to the Welcome page. Then click on \"Sign up Now\" on the right side of the page.     You will be asked to enter the access code listed below, as well as some personal information. Please follow the directions to create your username and password.     Your access code is: [email or web address]  Expires: 2019  8:20 AM     Your access code will  in 90 days. If you need help or a new code, please call your [place] clinic or [phone].        Care EveryWhere ID     This is your Care EveryWhere ID. This could be used by other organizations to access your [place] medical records  [id number]         Blood Pressure from Last 3 Encounters:   10/23/18 119/68   10/19/18 137/89    Weight from Last 3 Encounters:   18 86.2 kg (190 lb)   10/23/18 86.5 kg (190 lb 9.6 oz)   10/22/18 86.4 kg (190 lb 6.4 oz)              We Performed the Following     HC OT EVAL, LOW COMPLEXITY     TEODORA INITIAL EVAL REPORT     ORTHOTIC MGMT AND TRAINING, EACH 15 MIN     THERAPEUTIC EXERCISES        Primary Care Provider Office Phone # Fax #    Niko Khanna [phone]       [place] [address] [address] [facility] [address]        Equal Access to Services     [facility]: Hadii giselle ku hadasho Soomaali, waaxda luqadaha, qaybta kaalmada adeegyada, " miya kumarsami dempsey'aan ah. So [facility] [phone].    ATENCIÓN: Si leia galindo, tiene a heart disposición servicios gratuitos de asistencia lingüística. Daniel sosa [phone].    We comply with applicable federal civil rights laws and [place] laws. We do not discriminate on the basis of race, color, national origin, age, disability, sex, sexual orientation, or gender identity.            Thank you!     Thank you for choosing [facility]  for your care. Our goal is always to provide you with excellent care. Hearing back from our patients is one way we can continue to improve our services. Please take a few minutes to complete the written survey that you may receive in the mail after your visit with us. Thank you!             Your Updated Medication List - Protect others around you: Learn how to safely use, store and throw away your medicines at www.disposemymeds.org.          This list is accurate as of 11/21/18 10:24 AM.  Always use your most recent med list.                   Brand Name Dispense Instructions for use Diagnosis    HYDROcodone-acetaminophen 5-325 MG tablet    NORCO    25 tablet    Take 1-2 tablets by mouth every 4 hours as needed for moderate to severe pain    Closed displaced oblique fracture of shaft of radius, unspecified laterality, initial encounter       ibuprofen 600 MG tablet    ADVIL/MOTRIN    30 tablet    Take 1 tablet (600 mg) by mouth every 6 hours as needed for other (mild and/or inflammatory pain)    Closed displaced oblique fracture of shaft of radius, unspecified laterality, initial encounter       levonorgestrel 20 MCG/24HR IUD    MIRENA     1 each by Intrauterine route once        oxyCODONE-acetaminophen 5-325 MG tablet    PERCOCET    12 tablet    Take 1-2 tablets by mouth every 4 hours as needed for pain        senna-docusate 8.6-50 MG per tablet    SENOKOT-S;PERICOLACE    30 tablet    Take 1-2 tablets by mouth 2 times daily To prevent constipation while on  opiates    Closed displaced oblique fracture of shaft of radius, unspecified laterality, initial encounter

## 2018-11-21 NOTE — NURSING NOTE
"Reason For Visit:   Chief Complaint   Patient presents with     Surgical Followup     s/p galeazzi fracture left radius fracture DOS:10/23/18       Primary MD: Niko Khanna  Ref. MD: established    Age: 23 year old    ?  No      Ht 1.77 m (5' 9.69\")  Wt 86.2 kg (190 lb)  BMI 27.51 kg/m2                      QuickDASH Assessment  [facility] 10/22/2018   1.Open a tight or new jar. Unable   2. Do heavy household chores (e.g., wash walls, floors) Unable   3. Carry a shopping bag or briefcase. Moderate difficulty   4. Wash your back. Unable   5. Use a knife to cut food. Moderate difficulty   6. Recreational activities in which you take some force or impact through your arm, shoulder or hand (e.g., golf, hammering, tennis, etc.). Unable   7. During the past week, to what extent has your arm, shoulder or hand problem interfered with your normal social activities with family, friends, neighbours or groups? Extremely   8. During the past week, were you limited in your work or other regular daily activities as a result of your arm, shoulder or hand problem? Unable   9. Arm, shoulder or hand pain. Moderate   10.Tingling (pins and needles) in your arm,shoulder or hand. None   11. During the past week, how much difficulty have you had sleeping because of the pain in your arm, shoulder or hand? ([place] number) Moderate difficulty   Quickdash Ability Score 72.72          Current Outpatient Prescriptions   Medication Sig Dispense Refill     HYDROcodone-acetaminophen (NORCO) 5-325 MG per tablet Take 1-2 tablets by mouth every 4 hours as needed for moderate to severe pain 25 tablet 0     ibuprofen (ADVIL/MOTRIN) 600 MG tablet Take 1 tablet (600 mg) by mouth every 6 hours as needed for other (mild and/or inflammatory pain) 30 tablet 0     levonorgestrel (MIRENA) 20 MCG/24HR IUD 1 each by Intrauterine route once       oxyCODONE-acetaminophen (PERCOCET) 5-325 MG per tablet Take 1-2 tablets by mouth every 4 hours as " needed for pain (Patient not taking: Reported on 11/21/2018) 12 tablet 0     senna-docusate (SENOKOT-S;PERICOLACE) 8.6-50 MG per tablet Take 1-2 tablets by mouth 2 times daily To prevent constipation while on opiates (Patient not taking: Reported on 11/21/2018) 30 tablet 0       No Known Allergies    Kely Jett, ATC

## 2018-11-21 NOTE — PROGRESS NOTES
Hand surgery postop note    I saw the patient follow up today. She is status post open reduction internal fixation left radial shaft fracture and TFCC repair on October 23. She reports she is having minimal discomfort in the wrist or forearm. She's been in the cast with no issues with it. There is no numbness or tingling. She is back to school. There are no other concerns at this time.    On examination of the left forearm, incisions well-healed. There is no erythema or drainage. There is no fracture site tenderness. There is no swelling. Sensation is intact in the median, radial, and ulnar nerve distributions. She is able to flex and extend all digits and thumb.    X-rays were reviewed today demonstrate no change in hardware or bony alignment. Radial shaft fracture is still visualized.    Patient is progressing appropriately. She'll see hand therapy today and be fitted for custom [place] splint. She may begin active range of motion exercises of the wrist and forearm. I would like her to remain in the splint at all times other than showers and exercises. I will see her back in follow-up in 4 weeks. Anticipate likely discontinuation of splint at that time.

## 2018-11-21 NOTE — MR AVS SNAPSHOT
After Visit Summary   11/21/2018    Gela Miles    MRN: [id number]           Patient Information     Date Of Birth          1995        Visit Information        Provider Department      11/21/2018 8:45 AM Barron Ybarra MD [facility]        Today's Diagnoses     Closed fracture of distal end of radius, unspecified fracture morphology, unspecified laterality, initial encounter    -  1       Follow-ups after your visit        Additional Services     HAND THERAPY Occupational Therapy or Physical Therapy       Hand Therapy Referral  Can do active ROM, wrist flexion/extension and pronation/supination                  Follow-up notes from your care team     Return in about 4 weeks (around 12/19/2018).      Your next 10 appointments already scheduled     Dec 17, 2018  9:15 AM CST   (Arrive by 9:00 AM)   RETURN HAND with Barron Ybarra MD   [facility] ([facility])    [address] [place] [address] [id number] [phone]              Future tests that were ordered for you today     Open Future Orders        Priority Expected Expires Ordered    HAND THERAPY Occupational Therapy or Physical Therapy Routine  11/21/2019 11/21/2018            Who to contact     Please call your clinic at [phone] to:    Ask questions about your health    Make or cancel appointments    Discuss your medicines    Learn about your test results    Speak to your doctor            Additional Information About Your Visit        MyChart Information     Lightscape Materialst is an electronic gateway that provides easy, online access to your medical records. With Pinnatta, you can request a clinic appointment, read your test results, renew a prescription or communicate with your care team.     To sign up for [employer] Material[employer] visit the website at [email or web address]   You will be asked to enter the access code listed below, as well as some personal information.  "Please follow the directions to create your username and password.     Your access code is: [email or web address]  Expires: 2019  8:20 AM     Your access code will  in 90 days. If you need help or a new code, please contact your [facility] or call [phone] for assistance.        Care EveryWhere ID     This is your Care EveryWhere ID. This could be used by other organizations to access your [place] medical records  [id number]        Your Vitals Were     Height BMI (Body Mass Index)                1.77 m (5' 9.69\") 27.51 kg/m2           Blood Pressure from Last 3 Encounters:   10/23/18 119/68   10/19/18 137/89    Weight from Last 3 Encounters:   18 86.2 kg (190 lb)   10/23/18 86.5 kg (190 lb 9.6 oz)   10/22/18 86.4 kg (190 lb 6.4 oz)               Primary Care Provider Office Phone # Fax #    Niko Khanna [phone]       [place] [address]        Equal Access to Services     [facility] Hospital[facility] AH: Hadii aad ku hadasho Soomaali, waaxda luqadaha, qaybta kaalmada adeegyada, miya brewster . So [facility] [phone].    ATENCIÓN: Si habla español, tiene a heart disposición servicios gratuitos de asistencia lingüística. [facility] [phone].    We comply with applicable federal civil rights laws and [place] laws. We do not discriminate on the basis of race, color, national origin, age, disability, sex, sexual orientation, or gender identity.            Thank you!     Thank you for choosing [facility]  for your care. Our goal is always to provide you with excellent care. Hearing back from our patients is one way we can continue to improve our services. Please take a few minutes to complete the written survey that you may receive in the mail after your visit with us. Thank you!             Your Updated Medication List - Protect others around you: Learn how to safely use, store and throw away " your medicines at www.disposemymeds.org.          This list is accurate as of 11/21/18  9:03 AM.  Always use your most recent med list.                   Brand Name Dispense Instructions for use Diagnosis    HYDROcodone-acetaminophen 5-325 MG per tablet    NORCO    25 tablet    Take 1-2 tablets by mouth every 4 hours as needed for moderate to severe pain    Closed displaced oblique fracture of shaft of radius, unspecified laterality, initial encounter       ibuprofen 600 MG tablet    ADVIL/MOTRIN    30 tablet    Take 1 tablet (600 mg) by mouth every 6 hours as needed for other (mild and/or inflammatory pain)    Closed displaced oblique fracture of shaft of radius, unspecified laterality, initial encounter       levonorgestrel 20 MCG/24HR IUD    MIRENA     1 each by Intrauterine route once        oxyCODONE-acetaminophen 5-325 MG per tablet    PERCOCET    12 tablet    Take 1-2 tablets by mouth every 4 hours as needed for pain        senna-docusate 8.6-50 MG per tablet    SENOKOT-S;PERICOLACE    30 tablet    Take 1-2 tablets by mouth 2 times daily To prevent constipation while on opiates    Closed displaced oblique fracture of shaft of radius, unspecified laterality, initial encounter

## 2018-11-30 ENCOUNTER — THERAPY VISIT (OUTPATIENT)
Dept: OCCUPATIONAL THERAPY | Facility: CLINIC | Age: 23
End: 2018-11-30
Payer: COMMERCIAL

## 2018-11-30 DIAGNOSIS — M25.532 LEFT WRIST PAIN: ICD-10-CM

## 2018-11-30 DIAGNOSIS — S52.302A: ICD-10-CM

## 2018-11-30 DIAGNOSIS — Z47.89 AFTERCARE FOLLOWING SURGERY OF THE MUSCULOSKELETAL SYSTEM: ICD-10-CM

## 2018-11-30 PROCEDURE — 97110 THERAPEUTIC EXERCISES: CPT | Mod: GO | Performed by: OCCUPATIONAL THERAPIST

## 2018-11-30 PROCEDURE — 97140 MANUAL THERAPY 1/> REGIONS: CPT | Mod: GO | Performed by: OCCUPATIONAL THERAPIST

## 2018-11-30 PROCEDURE — 97530 THERAPEUTIC ACTIVITIES: CPT | Mod: GO | Performed by: OCCUPATIONAL THERAPIST

## 2018-11-30 NOTE — PROGRESS NOTES
SOAP note objective information for 11/30/2018.    Objective:  Pain Level Report: On scale 0-10/10  Date 11/21/2018    side L    Overall 2    At Rest 0    With Activity 2      Primary Report: location and description  Date 11/21/2018    Side L    Location Ulnar wrist, forearm    Radiation no    Pain Quality Achy, dull    Frequency intermittent    Duration With motion    Exacerbated by  Lifting, gripping, twisting, pinching, pulling    Relieved by Rest    Progression since onset Gradually improving      Scar: Well healing, sutures out, no drainage or redness, pink appearance    Edema: Measured circumferentially in cm  Date 11/21/2018 11/21/2018   Side R L   Distal Wrist Crease 16 17     Range of Motion Wrist AROM (PROM):  Date 11/21/2018 11/21/2018 11/30   Side R L L   Ext 68 45 45   Flex 85 25 60   UD 48 NT 33   RD 25 NT 10   Sup 85 85 WNL   Pro 80 -20 (not able to get to neutral) 20     ROM:  Pain Report:  - none    + mild    ++ moderate    +++ severe   Elbow  11/21/2018 11/30   AROM(PROM) R L L   Extension 0 -20 0   Flexion 145 135 143     ROM of fingers and thumb: WNL, very mild stiffness/pulling sensation noted, but able to form full fist and oppose to DPC of small finger    Strength: Contraindicated    Please refer to the daily flowsheet for treatment today, total treatment time and time spent performing 1:1 timed codes.       Home Program:   Exercise:   AROM of wrist (e/f, p/s) and elbow  Orthosis:   Static orthosis and long arm based orthosis   [place] orthosis full time. Recommend to remove for exercise and hygiene.  Activity:   Avoid activities that exacerbate pain.  Wean out of sling, can wear if uncomfortable around campus for protection  11/30  Functional pro/sup ax - pouring water, flipping cards, palming ball    Next Visit:  MFR  Pro/sup - fluido?  Wrist e/f

## 2018-11-30 NOTE — MR AVS SNAPSHOT
After Visit Summary   11/30/2018    Gela Miles    MRN: [id number]           Patient Information     Date Of Birth          1995        Visit Information        Provider Department      11/30/2018 4:00 PM Rachel Lauren OT [facility]        Today's Diagnoses     Left wrist pain        Aftercare following surgery of the musculoskeletal system        Fracture of shaft of left radius           Follow-ups after your visit        Your next 10 appointments already scheduled     Dec 07, 2018 12:00 PM CST   TEODORA Hand with NIALL Burrell [facility] ([facility])    [address] [place] [id number] [phone]            Dec 14, 2018 11:30 AM CST   TEODORA Hand with Calli Mullins OT   [facility] ([facility])    [address] [place] [address] [id number] [phone]            Dec 17, 2018  9:15 AM CST   (Arrive by 9:00 AM)   RETURN HAND with Barron Ybarra MD   [facility] ([facility])    [address] [place] [id number] [phone]              Who to contact     If you have questions or need follow up information about today's clinic visit or your schedule please contact [facility] directly at [phone].  Normal or non-critical lab and imaging results will be communicated to you by MyChart, letter or phone within 4 business days after the clinic has received the results. If you do not hear from us within 7 days, please contact the clinic through MyChart or phone. If you have a critical or abnormal lab result, we will notify you by phone as soon as possible.  Submit refill requests through [employer] or call your pharmacy and they will forward the refill request to us. Please allow 3 business days for your refill to be completed.          Additional Information About Your Visit        [facility]  "Information     Viverae lets you send messages to your doctor, view your test results, renew your prescriptions, schedule appointments and more. To sign up, go to [email or web address] . Click on \"Log in\" on the left side of the screen, which will take you to the Welcome page. Then click on \"Sign up Now\" on the right side of the page.     You will be asked to enter the access code listed below, as well as some personal information. Please follow the directions to create your username and password.     Your access code is: [email or web address]  Expires: 2019  8:20 AM     Your access code will  in 90 days. If you need help or a new code, please call your [place] clinic or [phone].        Care EveryWhere ID     This is your Care EveryWhere ID. This could be used by other organizations to access your [place] medical records  [id number]         Blood Pressure from Last 3 Encounters:   10/23/18 119/68   10/19/18 137/89    Weight from Last 3 Encounters:   18 86.2 kg (190 lb)   10/23/18 86.5 kg (190 lb 9.6 oz)   10/22/18 86.4 kg (190 lb 6.4 oz)              We Performed the Following     MANUAL THER TECH,1+REGIONS,EA 15 MIN     THERAPEUTIC ACTIVITIES     THERAPEUTIC EXERCISES        Primary Care Provider Office Phone # Fax #    Niko Khanna [phone]       [place] [address]        Equal Access to Services     WOJCIECH NOGUERA : Hadii aad ku hadasho Soomaali, waaxda luqadaha, qaybta kaalmada adeegyada, waxay idiin hayisa brewster . So [facility] [phone].    ATENCIÓN: Si habla español, tiene a heart disposición servicios gratuitos de asistencia lingüística. Llame al [phone].    We comply with applicable federal civil rights laws and [place] laws. We do not discriminate on the basis of race, color, national origin, age, disability, sex, sexual orientation, or gender identity.            Thank you!     Thank you for choosing  [facility]  for your care. Our goal is always to provide you with excellent care. Hearing back from our patients is one way we can continue to improve our services. Please take a few minutes to complete the written survey that you may receive in the mail after your visit with us. Thank you!             Your Updated Medication List - Protect others around you: Learn how to safely use, store and throw away your medicines at www.TheFriendMailemEagle Eye Networkseds.org.          This list is accurate as of 11/30/18  5:15 PM.  Always use your most recent med list.                   Brand Name Dispense Instructions for use Diagnosis    HYDROcodone-acetaminophen 5-325 MG tablet    NORCO    25 tablet    Take 1-2 tablets by mouth every 4 hours as needed for moderate to severe pain    Closed displaced oblique fracture of shaft of radius, unspecified laterality, initial encounter       ibuprofen 600 MG tablet    ADVIL/MOTRIN    30 tablet    Take 1 tablet (600 mg) by mouth every 6 hours as needed for other (mild and/or inflammatory pain)    Closed displaced oblique fracture of shaft of radius, unspecified laterality, initial encounter       levonorgestrel 20 MCG/24HR IUD    MIRENA     1 each by Intrauterine route once        oxyCODONE-acetaminophen 5-325 MG tablet    PERCOCET    12 tablet    Take 1-2 tablets by mouth every 4 hours as needed for pain        senna-docusate 8.6-50 MG tablet    SENOKOT-S/PERICOLACE    30 tablet    Take 1-2 tablets by mouth 2 times daily To prevent constipation while on opiates    Closed displaced oblique fracture of shaft of radius, unspecified laterality, initial encounter

## 2018-12-07 ENCOUNTER — THERAPY VISIT (OUTPATIENT)
Dept: OCCUPATIONAL THERAPY | Facility: CLINIC | Age: 23
End: 2018-12-07
Payer: COMMERCIAL

## 2018-12-07 DIAGNOSIS — Z47.89 AFTERCARE FOLLOWING SURGERY OF THE MUSCULOSKELETAL SYSTEM: ICD-10-CM

## 2018-12-07 DIAGNOSIS — S52.302A: ICD-10-CM

## 2018-12-07 DIAGNOSIS — M25.532 LEFT WRIST PAIN: ICD-10-CM

## 2018-12-07 PROCEDURE — 97530 THERAPEUTIC ACTIVITIES: CPT | Mod: GO | Performed by: OCCUPATIONAL THERAPIST

## 2018-12-07 PROCEDURE — 97140 MANUAL THERAPY 1/> REGIONS: CPT | Mod: GO | Performed by: OCCUPATIONAL THERAPIST

## 2018-12-07 PROCEDURE — 97110 THERAPEUTIC EXERCISES: CPT | Mod: GO | Performed by: OCCUPATIONAL THERAPIST

## 2018-12-07 NOTE — PROGRESS NOTES
SOAP note objective information for 12/7/2018.    Objective:  Pain Level Report: On scale 0-10/10  Date 11/21/2018    side L    Overall 2    At Rest 0    With Activity 2      Primary Report: location and description  Date 11/21/2018    Side L    Location Ulnar wrist, forearm    Radiation no    Pain Quality Achy, dull    Frequency intermittent    Duration With motion    Exacerbated by  Lifting, gripping, twisting, pinching, pulling    Relieved by Rest    Progression since onset Gradually improving      Scar: Well healing, sutures out, no drainage or redness, pink appearance    Edema: Measured circumferentially in cm  Date 11/21/2018 11/21/2018   Side R L   Distal Wrist Crease 16 17     Range of Motion Wrist AROM (PROM):  Date 11/21/2018 11/21/2018 11/30 12/7/18   Side R L L L   Ext 68 45 45 52   Flex 85 25 60 65   UD 48 NT 33 33   RD 25 NT 10 13   Sup 85 85 WNL WNL   Pro 80 -20 (not able to get to neutral) 20 18     ROM:  Pain Report:  - none    + mild    ++ moderate    +++ severe   Elbow  11/21/2018 11/30   AROM(PROM) R L L   Extension 0 -20 0   Flexion 145 135 143     ROM of fingers and thumb: WNL, very mild stiffness/pulling sensation noted, but able to form full fist and oppose to DPC of small finger    Strength: Contraindicated    Please refer to the daily flowsheet for treatment today, total treatment time and time spent performing 1:1 timed codes.       Home Program:   Exercise:   AROM of wrist (e/f, p/s) and elbow  Orthosis:   Static orthosis and long arm based orthosis   [place] orthosis full time. Recommend to remove for exercise and hygiene.  Activity:   Avoid activities that exacerbate pain.  Wean out of sling, can wear if uncomfortable around campus for protection  11/30  Functional pro/sup ax - pouring water, flipping cards, palming ball    Next Visit:  MFR and scar  Pro/sup - functional ax (rolling wheel, balls)  Wrist e/f

## 2018-12-07 NOTE — MR AVS SNAPSHOT
After Visit Summary   12/7/2018    Gela Miles    MRN: [id number]           Patient Information     Date Of Birth          1995        Visit Information        Provider Department      12/7/2018 12:00 PM Rachel Lauren OT [facility]        Today's Diagnoses     Left wrist pain        Aftercare following surgery of the musculoskeletal system        Fracture of shaft of left radius           Follow-ups after your visit        Your next 10 appointments already scheduled     Dec 14, 2018 11:30 AM CST   TEODORA Hand with Calli Mullins OT   [facility] ([facility])    [address] [place] [address] [phone]            Dec 17, 2018  9:15 AM CST   (Arrive by 9:00 AM)   RETURN HAND with Barron Ybarra MD   [facility] ([facility])    [address] [place] [phone]            Dec 19, 2018  9:00 AM CST   TEODORA Hand with Calli Mullins OT   [facility] ([facility])    [address] [place] [phone]              Who to contact     If you have questions or need follow up information about today's clinic visit or your schedule please contact [facility] directly at [phone].  Normal or non-critical lab and imaging results will be communicated to you by MyChart, letter or phone within 4 business days after the clinic has received the results. If you do not hear from us within 7 days, please contact the clinic through MyChart or phone. If you have a critical or abnormal lab result, we will notify you by phone as soon as possible.  Submit refill requests through [employer] or call your pharmacy and they will forward the refill request to us. Please allow 3 business days for your refill to be completed.          Additional Information About Your Visit        [facility]  "Information     Yuuguu lets you send messages to your doctor, view your test results, renew your prescriptions, schedule appointments and more. To sign up, go to [email or web address] . Click on \"Log in\" on the left side of the screen, which will take you to the Welcome page. Then click on \"Sign up Now\" on the right side of the page.     You will be asked to enter the access code listed below, as well as some personal information. Please follow the directions to create your username and password.     Your access code is: [email or web address]  Expires: 2019  8:20 AM     Your access code will  in 90 days. If you need help or a new code, please call your [place] clinic or [phone].        Care EveryWhere ID     This is your Care EveryWhere ID. This could be used by other organizations to access your [place] medical records  [id number]         Blood Pressure from Last 3 Encounters:   10/23/18 119/68   10/19/18 137/89    Weight from Last 3 Encounters:   18 86.2 kg (190 lb)   10/23/18 86.5 kg (190 lb 9.6 oz)   10/22/18 86.4 kg (190 lb 6.4 oz)              We Performed the Following     MANUAL THER TECH,1+REGIONS,EA 15 MIN     THERAPEUTIC ACTIVITIES     THERAPEUTIC EXERCISES        Primary Care Provider Office Phone # Fax #    Niko Khanna [phone]       [place] [address]        Equal Access to Services     WOJCIECH NOGUERA : Hadii aad ku hadasho Soomaali, waaxda luqadaha, qaybta kaalmada adeegyada, waxay idiin hayisa brewster . So [facility] [phone].    ATENCIÓN: Si habla español, tiene a heart disposición servicios gratuitos de asistencia lingüística. Llame al [phone].    We comply with applicable federal civil rights laws and [place] laws. We do not discriminate on the basis of race, color, national origin, age, disability, sex, sexual orientation, or gender identity.            Thank you!     Thank you for choosing  [facility]  for your care. Our goal is always to provide you with excellent care. Hearing back from our patients is one way we can continue to improve our services. Please take a few minutes to complete the written survey that you may receive in the mail after your visit with us. Thank you!             Your Updated Medication List - Protect others around you: Learn how to safely use, store and throw away your medicines at www.Gigitemymeds.org.          This list is accurate as of 12/7/18 12:45 PM.  Always use your most recent med list.                   Brand Name Dispense Instructions for use Diagnosis    HYDROcodone-acetaminophen 5-325 MG tablet    NORCO    25 tablet    Take 1-2 tablets by mouth every 4 hours as needed for moderate to severe pain    Closed displaced oblique fracture of shaft of radius, unspecified laterality, initial encounter       ibuprofen 600 MG tablet    ADVIL/MOTRIN    30 tablet    Take 1 tablet (600 mg) by mouth every 6 hours as needed for other (mild and/or inflammatory pain)    Closed displaced oblique fracture of shaft of radius, unspecified laterality, initial encounter       levonorgestrel 20 MCG/24HR IUD    MIRENA     1 each by Intrauterine route once        oxyCODONE-acetaminophen 5-325 MG tablet    PERCOCET    12 tablet    Take 1-2 tablets by mouth every 4 hours as needed for pain        senna-docusate 8.6-50 MG tablet    SENOKOT-S/PERICOLACE    30 tablet    Take 1-2 tablets by mouth 2 times daily To prevent constipation while on opiates    Closed displaced oblique fracture of shaft of radius, unspecified laterality, initial encounter

## 2018-12-14 ENCOUNTER — THERAPY VISIT (OUTPATIENT)
Dept: OCCUPATIONAL THERAPY | Facility: CLINIC | Age: 23
End: 2018-12-14
Payer: COMMERCIAL

## 2018-12-14 DIAGNOSIS — S52.302A: ICD-10-CM

## 2018-12-14 DIAGNOSIS — M25.532 LEFT WRIST PAIN: ICD-10-CM

## 2018-12-14 DIAGNOSIS — Z47.89 AFTERCARE FOLLOWING SURGERY OF THE MUSCULOSKELETAL SYSTEM: ICD-10-CM

## 2018-12-14 PROCEDURE — 97140 MANUAL THERAPY 1/> REGIONS: CPT | Mod: GO | Performed by: OCCUPATIONAL THERAPIST

## 2018-12-14 PROCEDURE — 97110 THERAPEUTIC EXERCISES: CPT | Mod: GO | Performed by: OCCUPATIONAL THERAPIST

## 2018-12-14 NOTE — PROGRESS NOTES
SOAP note objective information for 12/14/2018.    Objective:  Pain Level Report: On scale 0-10/10  Date 11/21/2018    side L    Overall 2    At Rest 0    With Activity 2      Primary Report: location and description  Date 11/21/2018    Side L    Location Ulnar wrist, forearm    Radiation no    Pain Quality Achy, dull    Frequency intermittent    Duration With motion    Exacerbated by  Lifting, gripping, twisting, pinching, pulling    Relieved by Rest    Progression since onset Gradually improving      Scar: Well healing, sutures out, no drainage or redness, pink appearance    Edema: Measured circumferentially in cm  Date 11/21/2018 11/21/2018   Side R L   Distal Wrist Crease 16 17     Range of Motion Wrist AROM (PROM):  Date 11/21/2018 11/21/2018 11/30 12/7/18 12/14/18   Side R L L L L   Ext 68 45 45 52 56   Flex 85 25 60 65 65   UD 48 NT 33 33 26   RD 25 NT 10 13 13   Sup 85 85 WNL WNL WNL   Pro 80 -20 (not able to get to neutral) 20 18 20     ROM:  Pain Report:  - none    + mild    ++ moderate    +++ severe   Elbow  11/21/2018 11/30   AROM(PROM) R L L   Extension 0 -20 0   Flexion 145 135 143     ROM of fingers and thumb: WNL, very mild stiffness/pulling sensation noted, but able to form full fist and oppose to DPC of small finger    Strength: Contraindicated    Please refer to the daily flowsheet for treatment today, total treatment time and time spent performing 1:1 timed codes.       Home Program:   Exercise:   AROM of wrist (e/f, p/s) and elbow  Orthosis:   Static orthosis and long arm based orthosis   [place] orthosis full time. Recommend to remove for exercise and hygiene.  Activity:   Avoid activities that exacerbate pain.  Wean out of sling, can wear if uncomfortable around campus for protection  11/30  Functional pro/sup ax - pouring water, flipping cards, palming ball    Next Visit:  MFR and scar  Pro/sup - functional ax (rolling wheel, balls)  Wrist e/f  Pt sees MD on Mon. Possible discontinue  splint.

## 2018-12-16 DIAGNOSIS — S52.609A CLOSED FRACTURE OF LOWER END OF RADIUS AND ULNA: Primary | ICD-10-CM

## 2018-12-16 DIAGNOSIS — S52.509A CLOSED FRACTURE OF LOWER END OF RADIUS AND ULNA: Primary | ICD-10-CM

## 2018-12-17 ENCOUNTER — TELEPHONE (OUTPATIENT)
Dept: ORTHOPEDICS | Facility: CLINIC | Age: 23
End: 2018-12-17

## 2018-12-17 ENCOUNTER — ANCILLARY PROCEDURE (OUTPATIENT)
Dept: GENERAL RADIOLOGY | Facility: CLINIC | Age: 23
End: 2018-12-17
Attending: ORTHOPAEDIC SURGERY
Payer: COMMERCIAL

## 2018-12-17 ENCOUNTER — OFFICE VISIT (OUTPATIENT)
Dept: ORTHOPEDICS | Facility: CLINIC | Age: 23
End: 2018-12-17
Payer: COMMERCIAL

## 2018-12-17 VITALS — WEIGHT: 190 LBS | HEIGHT: 70 IN | BODY MASS INDEX: 27.2 KG/M2

## 2018-12-17 DIAGNOSIS — S52.509A CLOSED FRACTURE OF LOWER END OF RADIUS AND ULNA: ICD-10-CM

## 2018-12-17 DIAGNOSIS — S52.372D CLOSED GALEAZZI'S FRACTURE OF LEFT RADIUS WITH ROUTINE HEALING, SUBSEQUENT ENCOUNTER: ICD-10-CM

## 2018-12-17 DIAGNOSIS — S52.372D CLOSED GALEAZZI'S FRACTURE OF LEFT RADIUS WITH ROUTINE HEALING, SUBSEQUENT ENCOUNTER: Primary | ICD-10-CM

## 2018-12-17 DIAGNOSIS — S52.609A CLOSED FRACTURE OF LOWER END OF RADIUS AND ULNA: ICD-10-CM

## 2018-12-17 LAB — DEPRECATED CALCIDIOL+CALCIFEROL SERPL-MC: 15 UG/L (ref 20–75)

## 2018-12-17 ASSESSMENT — MIFFLIN-ST. JEOR: SCORE: 1687.08

## 2018-12-17 NOTE — PROGRESS NOTES
"[facility]  Orthopedics  Barron Ybarra MD  2018     Name: Gela Miles  MRN: [id number]  Age: 23 year old  : 1995  Referring provider: Barron Ybarra     Chief Complaint: postoperative evaluation     Date of Surgery: 10/23/2018    Procedure: ORIF left radial shaft     History of Present Illness:   Gela Miles is a 23 year old female status-post the above procedure who presents for postoperative evaluation. Today, the patient is doing well. She has been working with hand therapy weekly but is finding pronation the most challenging. She has minor sensitivity over the incision but denies any pain at the fracture site. No n/t. No complaints today.     Physical Examination:  Ht 1.778 m (5' 10\")   Wt 86.2 kg (190 lb)   BMI 27.26 kg/m    General: Healthy appearing female. Affect appropriate. Normal gait. Alert and oriented to surroundings.   On examination of the Left Upper Extremity:   Incision is well-healed. No sensory deficits. Fingers wwp.   80  of supination. 10  of pronation. 40  of wrist extension. 65  of wrist flexion.     Radiographs:   Radiographs of the left forearm - 2 views (2018):  No change in alignment or bony hardware but persistent visualization of the fracture site.     I have independently reviewed the above imaging studies; the results were discussed with the patient.     Assessment:   23 year old female s/p ORIF left radial shaft and TFCC repair.     Plan:   We will check a vitamin D level. If this is low, we will discuss supplementation. We will also obtain bilateral forearm x-rays in neutral rotation due to pronation limitation although I suspect this is most likely due to stiffness. Discussed she should continue to wear her brace full time except when doing wrist exercises. Wrist ROM is OK but no forearm rotation. Follow up in 4 weeks.     Barron Ybarra MD      Scribe Disclosure:   I, Erna Tinajero, am serving as a scribe to document services personally performed " by Barron Ybarra MD at this visit, based upon the provider's statements to me. All documentation has been reviewed by the aforementioned provider prior to being entered into the official medical record.

## 2018-12-17 NOTE — TELEPHONE ENCOUNTER
Patient called back regarding vitamin D.  She was told that her Vitamin D is low.  We recommend that she take 4746-2794 units daily.  She verbalized understanding.

## 2018-12-17 NOTE — NURSING NOTE
"Reason For Visit:   Chief Complaint   Patient presents with     RECHECK     DOS 10/23 Open reduction internal fixation left radial shaft fx        Primary MD: Niko Khanna      Age: 23 yr. Old        Ht 1.778 m (5' 10\")   Wt 86.2 kg (190 lb)   BMI 27.26 kg/m        Pain Assessment  Patient Currently in Pain: Denies(Increases with certain motions)               QuickDASH Assessment  QuickDASH Main 12/17/2018   1.Open a tight or new jar. Moderate difficulty   2. Do heavy household chores (e.g., wash walls, floors) Moderate difficulty   3. Carry a shopping bag or briefcase. Mild difficulty   4. Wash your back. Severe difficulty   5. Use a knife to cut food. Mild difficulty   6. Recreational activities in which you take some force or impact through your arm, shoulder or hand (e.g., golf, hammering, tennis, etc.). Severe difficulty   7. During the past week, to what extent has your arm, shoulder or hand problem interfered with your normal social activities with family, friends, neighbours or groups? Slightly   8. During the past week, were you limited in your work or other regular daily activities as a result of your arm, shoulder or hand problem? Slightly limited   9. Arm, shoulder or hand pain. Mild   10.Tingling (pins and needles) in your arm,shoulder or hand. None   11. During the past week, how much difficulty have you had sleeping because of the pain in your arm, shoulder or hand? ([place] number) Mild difficulty   Quickdash Ability Score 36.36          Current Outpatient Medications   Medication Sig Dispense Refill     ibuprofen (ADVIL/MOTRIN) 600 MG tablet Take 1 tablet (600 mg) by mouth every 6 hours as needed for other (mild and/or inflammatory pain) 30 tablet 0     levonorgestrel (MIRENA) 20 MCG/24HR IUD 1 each by Intrauterine route once       HYDROcodone-acetaminophen (NORCO) 5-325 MG per tablet Take 1-2 tablets by mouth every 4 hours as needed for moderate to severe pain (Patient not taking: Reported on " 12/17/2018) 25 tablet 0     oxyCODONE-acetaminophen (PERCOCET) 5-325 MG per tablet Take 1-2 tablets by mouth every 4 hours as needed for pain (Patient not taking: Reported on 11/21/2018) 12 tablet 0     senna-docusate (SENOKOT-S;PERICOLACE) 8.6-50 MG per tablet Take 1-2 tablets by mouth 2 times daily To prevent constipation while on opiates (Patient not taking: Reported on 11/21/2018) 30 tablet 0       No Known Allergies    Ambreen Reyes, ATC

## 2018-12-17 NOTE — TELEPHONE ENCOUNTER
Voicemail left for patient to call back to discuss her Vitamin D level.    Her vitamin D is 15, which is low.  Dr. Ybarra would like patient to start a Vitamin D supplement, which she can buy over the counter. The recommended dose would be 4050-2295 units per day.

## 2018-12-17 NOTE — LETTER
"2018       RE: Gela Miles  [address]  [place] [address]     Dear Colleague,    Thank you for referring your patient, Gela Miles, to the [facility]IC [facility] at [facility]. Please see a copy of my visit note below.    [facility]  Orthopedics  Barron Ybarra MD  2018     Name: Gela Miles  MRN: [id number]  Age: 23 year old  : 1995  Referring provider: Barron Ybarra     Chief Complaint: postoperative evaluation     Date of Surgery: 10/23/2018    Procedure: ORIF left radial shaft     History of Present Illness:   Gela Miles is a 23 year old female status-post the above procedure who presents for postoperative evaluation. Today, the patient is doing well. She has been working with hand therapy weekly but is finding pronation the most challenging. She has minor sensitivity over the incision but denies any pain at the fracture site. No n/t. No complaints today.     Physical Examination:  Ht 1.778 m (5' 10\")   Wt 86.2 kg (190 lb)   BMI 27.26 kg/m     General: Healthy appearing female. Affect appropriate. Normal gait. Alert and oriented to surroundings.   On examination of the Left Upper Extremity:   Incision is well-healed. No sensory deficits. Fingers wwp.   80  of supination. 10  of pronation. 40  of wrist extension. 65  of wrist flexion.     Radiographs:   Radiographs of the left forearm - 2 views (2018):  No change in alignment or bony hardware but persistent visualization of the fracture site.     I have independently reviewed the above imaging studies; the results were discussed with the patient.     Assessment:   23 year old female s/p ORIF left radial shaft and TFCC repair.     Plan:   We will check a vitamin D level. If this is low, we will discuss supplementation. We will also obtain bilateral forearm x-rays in neutral rotation due to pronation limitation although I suspect this is most likely due to stiffness. Discussed " she should continue to wear her brace full time except when doing wrist exercises. Wrist ROM is OK but no forearm rotation. Follow up in 4 weeks.     Barron Ybarra MD      Scribe Disclosure:   I, Erna Tinajero, am serving as a scribe to document services personally performed by Barron Ybarra MD at this visit, based upon the provider's statements to me. All documentation has been reviewed by the aforementioned provider prior to being entered into the official medical record.

## 2018-12-19 ENCOUNTER — THERAPY VISIT (OUTPATIENT)
Dept: OCCUPATIONAL THERAPY | Facility: CLINIC | Age: 23
End: 2018-12-19
Payer: COMMERCIAL

## 2018-12-19 DIAGNOSIS — Z47.89 AFTERCARE FOLLOWING SURGERY OF THE MUSCULOSKELETAL SYSTEM: ICD-10-CM

## 2018-12-19 DIAGNOSIS — S52.302A: ICD-10-CM

## 2018-12-19 DIAGNOSIS — M25.532 LEFT WRIST PAIN: ICD-10-CM

## 2018-12-19 PROCEDURE — 97140 MANUAL THERAPY 1/> REGIONS: CPT | Mod: GO | Performed by: OCCUPATIONAL THERAPIST

## 2018-12-19 PROCEDURE — 97110 THERAPEUTIC EXERCISES: CPT | Mod: GO | Performed by: OCCUPATIONAL THERAPIST

## 2018-12-19 NOTE — PROGRESS NOTES
SOAP note objective information for 12/19/2018.    Per MD note: continue to wear brace full time except when doing wrist exercises. Wrist ROM is OK but no forearm rotation.      Objective:  Pain Level Report: On scale 0-10/10  Date 11/21/2018 12/19/18   side L    Overall 2    At Rest 0    With Activity 2      Primary Report: location and description  Date 11/21/2018    Side L    Location Ulnar wrist, forearm    Radiation no    Pain Quality Achy, dull    Frequency intermittent    Duration With motion    Exacerbated by  Lifting, gripping, twisting, pinching, pulling    Relieved by Rest    Progression since onset Gradually improving      Scar: Well healing, sutures out, no drainage or redness, pink appearance    Edema: Measured circumferentially in cm  Date 11/21/2018 11/21/2018   Side R L   Distal Wrist Crease 16 17     Range of Motion Wrist AROM (PROM):  Date 11/21/2018 11/21/2018 11/30 12/7/18 12/14/18 12/19/18   Side R L L L L L   Ext 68 45 45 52 56 58   Flex 85 25 60 65 65 65   UD 48 NT 33 33 26 25   RD 25 NT 10 13 13 12   Sup 85 85 WNL WNL WNL NT   Pro 80 -20 (not able to get to neutral) 20 18 20 NT     ROM:  Pain Report:  - none    + mild    ++ moderate    +++ severe   Elbow  11/21/2018 11/30   AROM(PROM) R L L   Extension 0 -20 0   Flexion 145 135 143     ROM of fingers and thumb: WNL, very mild stiffness/pulling sensation noted, but able to form full fist and oppose to DPC of small finger    Strength: Contraindicated    Please refer to the daily flowsheet for treatment today, total treatment time and time spent performing 1:1 timed codes.       Home Program:   Exercise:   AROM of wrist (e/f) and elbow  Orthosis:   Static orthosis and long arm based orthosis   [place] orthosis full time. Recommend to remove for exercise and hygiene.  Activity:   Avoid activities that exacerbate pain.  Wean out of sling, can wear if uncomfortable around campus for protection  11/30  Functional pro/sup ax - pouring water,  flipping cards, palming ball    Next Visit:  MFR and scar  AAROM wrist E/F  Hold on Pro/sup exercises  Wrist e/f

## 2019-01-02 ENCOUNTER — THERAPY VISIT (OUTPATIENT)
Dept: OCCUPATIONAL THERAPY | Facility: CLINIC | Age: 24
End: 2019-01-02
Payer: COMMERCIAL

## 2019-01-02 DIAGNOSIS — S52.302A: ICD-10-CM

## 2019-01-02 DIAGNOSIS — Z47.89 AFTERCARE FOLLOWING SURGERY OF THE MUSCULOSKELETAL SYSTEM: ICD-10-CM

## 2019-01-02 DIAGNOSIS — M25.532 LEFT WRIST PAIN: ICD-10-CM

## 2019-01-02 PROCEDURE — 97140 MANUAL THERAPY 1/> REGIONS: CPT | Mod: GO | Performed by: OCCUPATIONAL THERAPIST

## 2019-01-02 PROCEDURE — 97110 THERAPEUTIC EXERCISES: CPT | Mod: GO | Performed by: OCCUPATIONAL THERAPIST

## 2019-01-02 NOTE — PROGRESS NOTES
SOAP note objective information for 1/2/19.    Per MD note: continue to wear brace full time except when doing wrist exercises. Wrist ROM is OK but no forearm rotation.      Objective:  Pain Level Report: On scale 0-10/10  Date 11/21/2018 12/19/18   side L    Overall 2    At Rest 0    With Activity 2      Primary Report: location and description  Date 11/21/2018    Side L    Location Ulnar wrist, forearm    Radiation no    Pain Quality Achy, dull    Frequency intermittent    Duration With motion    Exacerbated by  Lifting, gripping, twisting, pinching, pulling    Relieved by Rest    Progression since onset Gradually improving      Scar: Well healing, sutures out, no drainage or redness, pink appearance    Edema: Measured circumferentially in cm  Date 11/21/2018 11/21/2018   Side R L   Distal Wrist Crease 16 17     Range of Motion Wrist AROM (PROM):  Date 11/21/2018 11/21/2018 11/30 12/7/18 12/14/18 12/19/18 1/2/19   Side R L L L L L L   Ext 68 45 45 52 56 58 55   Flex 85 25 60 65 65 65 65 (75 post)   UD 48 NT 33 33 26 25 27   RD 25 NT 10 13 13 12 11   Sup 85 85 WNL WNL WNL NT NT   Pro 80 -20 (not able to get to neutral) 20 18 20 NT NT     ROM:  Pain Report:  - none    + mild    ++ moderate    +++ severe   Elbow  11/21/2018 11/30   AROM(PROM) R L L   Extension 0 -20 0   Flexion 145 135 143     ROM of fingers and thumb: WNL, very mild stiffness/pulling sensation noted, but able to form full fist and oppose to DPC of small finger    Strength: Contraindicated    Please refer to the daily flowsheet for treatment today, total treatment time and time spent performing 1:1 timed codes.       Home Program:   Exercise:   AROM of wrist (e/f) and elbow  Orthosis:   Static orthosis and long arm based orthosis   [place] orthosis full time. Recommend to remove for exercise and hygiene.  Activity:   Avoid activities that exacerbate pain.  Wean out of sling, can wear if uncomfortable around campus for protection  11/30  Functional  pro/sup ax - pouring water, flipping cards, palming ball    Next Visit:  MFR and scar  AAROM wrist E/F  Hold on Pro/sup exercises  Wrist e/f

## 2019-01-09 ENCOUNTER — THERAPY VISIT (OUTPATIENT)
Dept: OCCUPATIONAL THERAPY | Facility: CLINIC | Age: 24
End: 2019-01-09
Payer: COMMERCIAL

## 2019-01-09 DIAGNOSIS — M25.532 LEFT WRIST PAIN: ICD-10-CM

## 2019-01-09 DIAGNOSIS — S52.302A: ICD-10-CM

## 2019-01-09 DIAGNOSIS — Z47.89 AFTERCARE FOLLOWING SURGERY OF THE MUSCULOSKELETAL SYSTEM: ICD-10-CM

## 2019-01-09 PROCEDURE — 97140 MANUAL THERAPY 1/> REGIONS: CPT | Mod: GO | Performed by: OCCUPATIONAL THERAPIST

## 2019-01-09 PROCEDURE — 97110 THERAPEUTIC EXERCISES: CPT | Mod: GO | Performed by: OCCUPATIONAL THERAPIST

## 2019-01-09 NOTE — PROGRESS NOTES
SOAP note objective information for 1/9/19.    Per MD note: continue to wear brace full time except when doing wrist exercises. Wrist ROM is OK but no forearm rotation.      Objective:  Pain Level Report: On scale 0-10/10  Date 11/21/2018    side L    Overall 2    At Rest 0    With Activity 2      Primary Report: location and description  Date 11/21/2018    Side L    Location Ulnar wrist, forearm    Radiation no    Pain Quality Achy, dull    Frequency intermittent    Duration With motion    Exacerbated by  Lifting, gripping, twisting, pinching, pulling    Relieved by Rest    Progression since onset Gradually improving      Scar: Well healing, sutures out, no drainage or redness, pink appearance    Edema: Measured circumferentially in cm  Date 11/21/2018 11/21/2018   Side R L   Distal Wrist Crease 16 17     Range of Motion Wrist AROM (PROM):  Date 11/21/2018 11/21/2018 11/30 12/7/18 12/14/18 12/19/18 1/2/19 1/9/19   Side R L L L L L L L   Ext 68 45 45 52 56 58 55 60   Flex 85 25 60 65 65 65 65 (75 post) 75   UD 48 NT 33 33 26 25 27    RD 25 NT 10 13 13 12 11    Sup 85 85 WNL WNL WNL NT NT    Pro 80 -20 (not able to get to neutral) 20 18 20 NT NT      ROM:  Pain Report:  - none    + mild    ++ moderate    +++ severe   Elbow  11/21/2018 11/30   AROM(PROM) R L L   Extension 0 -20 0   Flexion 145 135 143     ROM of fingers and thumb: WNL, very mild stiffness/pulling sensation noted, but able to form full fist and oppose to DPC of small finger    Strength: Contraindicated    Please refer to the daily flowsheet for treatment today, total treatment time and time spent performing 1:1 timed codes.       Home Program:   Exercise:   AROM of wrist (e/f) and elbow  Orthosis:   Static orthosis and long arm based orthosis   [place] orthosis full time. Recommend to remove for exercise and hygiene.  Activity:   Avoid activities that exacerbate pain.  Wean out of sling, can wear if uncomfortable around campus for  protection  11/30  Functional pro/sup ax - pouring water, flipping cards, palming ball    Next Visit:  PILI and scar  AAROM wrist E/F  Hold on Pro/sup exercises  Wrist e/f    Progress Pro/sup exercises if indicated by MD per bone healing

## 2019-01-10 DIAGNOSIS — S52.372D CLOSED GALEAZZI'S FRACTURE OF LEFT RADIUS WITH ROUTINE HEALING, SUBSEQUENT ENCOUNTER: Primary | ICD-10-CM

## 2019-01-12 NOTE — PROGRESS NOTES
[facility]  Orthopedics  Barron Ybarra MD  2019     Name: Gela Miles  MRN: [id number]  Age: 23 year old  : 1995  Referring provider: Barron Ybarra     Chief Complaint:   Surgical follow up    Date of Surgery: 10/23/2018    Procedure Performed: Open reduction internal fixation left galleapalma fx     History of Present Illness:   Gela Miles comes to see me in follow-up today from the above surgery. No numbess or tingling. Still having occasional ulnar sided wrist pain but no pain in the radial shaft. She hasn't been working on wrist rotation in therapy due to instructions at last visit. She has been taking vitamin D    Physical Examination:  General: Healthy appearing female. Affect appropriate. Normal gait. Alert and oriented to surroundings.   On examination of the Left Upper Extremity:   Incisions well-healed. No sensory deficits. Fingers wwp.   90  of supination. 25  of pronation. 55  of wrist extension. 80  of wrist flexion.   No tenderness over the fracture site  DRUJ feels stable    Radiographs:   Radiographs of the left forearm - 2 views (2019):  Good alignment of her radial shaft fracture with improved fracture healing compared to prior x-ray  Bilateral forearm x-rays obtained at last visit demonstrates well maintained radial bow. DRUJ appears reduced.    Assessment: 23 year old female s/p ORIF left radial shaft and TFCC repair, progressing as expected. Does have some limitation in pronation but with a soft endpoint. Fracture is well aligned with no suggestion of hardware impingement. I would like to see how this progresses with therapy now that we have discontinued her splint.    Plan:    1. Discontinue splint  2. Avoid lifting more than 10 pounds, otherwise no restrictions  3. Working on pronation and supination with hand therapy  4. Follow up in 6 weeks    Barron Ybarra MD    Scribe Disclosure:   I, Gilmar Miller, am serving as a scribe to document services personally  performed by Barron Ybarra MD at this visit, based upon the provider's statements to me. All documentation has been reviewed by the aforementioned provider prior to being entered into the official medical record.     Portions of this medical record were completed by a scribe. UPON MY REVIEW AND AUTHENTICATION BY ELECTRONIC SIGNATURE, this confirms (a) I performed the applicable clinical services, and (b) the record is accurate.

## 2019-01-14 ENCOUNTER — OFFICE VISIT (OUTPATIENT)
Dept: ORTHOPEDICS | Facility: CLINIC | Age: 24
End: 2019-01-14
Payer: COMMERCIAL

## 2019-01-14 ENCOUNTER — ANCILLARY PROCEDURE (OUTPATIENT)
Dept: GENERAL RADIOLOGY | Facility: CLINIC | Age: 24
End: 2019-01-14
Payer: COMMERCIAL

## 2019-01-14 DIAGNOSIS — S52.372D CLOSED GALEAZZI'S FRACTURE OF LEFT RADIUS WITH ROUTINE HEALING, SUBSEQUENT ENCOUNTER: ICD-10-CM

## 2019-01-14 DIAGNOSIS — S52.372D CLOSED GALEAZZI'S FRACTURE OF LEFT RADIUS WITH ROUTINE HEALING, SUBSEQUENT ENCOUNTER: Primary | ICD-10-CM

## 2019-01-14 NOTE — NURSING NOTE
Reason For Visit:   Chief Complaint   Patient presents with     RECHECK     ORIF Left radial shaft fx, and triangular fibrocartilage complex (TFCC) repair DOS 10/23/18       Primary MD: Niko Khanna    ?  No    Age: 23 year old      Date of injury: 10/19/18  Type of injury: Left radial shaft fx.  Date of surgery: 10/23/18  Type of surgery: ORIF Left Radial Shaft Fracture, Triangular Fibrocartilage Complex Repair.        There were no vitals taken for this visit.      Pain Assessment  Patient Currently in Pain: Denies               QuickDASH Assessment  QuickDASH Main 12/17/2018   1.Open a tight or new jar. Moderate difficulty   2. Do heavy household chores (e.g., wash walls, floors) Moderate difficulty   3. Carry a shopping bag or briefcase. Mild difficulty   4. Wash your back. Severe difficulty   5. Use a knife to cut food. Mild difficulty   6. Recreational activities in which you take some force or impact through your arm, shoulder or hand (e.g., golf, hammering, tennis, etc.). Severe difficulty   7. During the past week, to what extent has your arm, shoulder or hand problem interfered with your normal social activities with family, friends, neighbours or groups? Slightly   8. During the past week, were you limited in your work or other regular daily activities as a result of your arm, shoulder or hand problem? Slightly limited   9. Arm, shoulder or hand pain. Mild   10.Tingling (pins and needles) in your arm,shoulder or hand. None   11. During the past week, how much difficulty have you had sleeping because of the pain in your arm, shoulder or hand? ([place] number) Mild difficulty   Quickdash Ability Score 36.36          No Known Allergies    Ambreen Reyes ATC

## 2019-01-14 NOTE — LETTER
2019       RE: eGla Miles  [address]  [place] [facility] [facility] [address]     Dear Colleague,    Thank you for referring your patient, Gela Miles, to the [facility] ORTHOPAEDIC CLINIC at [facility]. Please see a copy of my visit note below.    [facility]  Orthopedics  Barron Ybarra MD  2019     Name: Gela Miles  MRN: [id number]  Age: 23 year old  : 1995  Referring provider: Barron Ybarra     Chief Complaint:   Surgical follow up    Date of Surgery: 10/23/2018    Procedure Performed: Open reduction internal fixation left galleazi fx     History of Present Illness:   Gela Miles comes to see me in follow-up today from the above surgery. No numbess or tingling. Still having occasional ulnar sided wrist pain but no pain in the radial shaft. She hasn't been working on wrist rotation in therapy due to instructions at last visit. She has been taking vitamin D    Physical Examination:  General: Healthy appearing female. Affect appropriate. Normal gait. Alert and oriented to surroundings.   On examination of the Left Upper Extremity:   Incisions well-healed. No sensory deficits. Fingers wwp.   90  of supination. 25  of pronation. 55  of wrist extension. 80  of wrist flexion.   No tenderness over the fracture site  DRUJ feels stable    Radiographs:   Radiographs of the left forearm - 2 views (2019):  Good alignment of her radial shaft fracture with improved fracture healing compared to prior x-ray  Bilateral forearm x-rays obtained at last visit demonstrates well maintained radial bow. DRUJ appears reduced.    Assessment: 23 year old female s/p ORIF left radial shaft and TFCC repair, progressing as expected. Does have some limitation in pronation but with a soft endpoint. Fracture is well aligned with no suggestion of hardware impingement. I would like to see how this progresses with therapy now that we have discontinued her splint.    Plan:    1. Discontinue  splint  2. Avoid lifting more than 10 pounds, otherwise no restrictions  3. Working on pronation and supination with hand therapy  4. Follow up in 6 weeks    Barron Ybarra MD    Scribe Disclosure:   I, Gilmar Angela, am serving as a scribe to document services personally performed by Barron Ybarra MD at this visit, based upon the provider's statements to me. All documentation has been reviewed by the aforementioned provider prior to being entered into the official medical record.     Portions of this medical record were completed by a scribe. UPON MY REVIEW AND AUTHENTICATION BY ELECTRONIC SIGNATURE, this confirms (a) I performed the applicable clinical services, and (b) the record is accurate.

## 2019-01-16 ENCOUNTER — THERAPY VISIT (OUTPATIENT)
Dept: OCCUPATIONAL THERAPY | Facility: CLINIC | Age: 24
End: 2019-01-16
Payer: COMMERCIAL

## 2019-01-16 DIAGNOSIS — S52.302A: ICD-10-CM

## 2019-01-16 DIAGNOSIS — M25.532 LEFT WRIST PAIN: ICD-10-CM

## 2019-01-16 DIAGNOSIS — Z47.89 AFTERCARE FOLLOWING SURGERY OF THE MUSCULOSKELETAL SYSTEM: ICD-10-CM

## 2019-01-16 PROCEDURE — 97110 THERAPEUTIC EXERCISES: CPT | Mod: GO | Performed by: OCCUPATIONAL THERAPIST

## 2019-01-16 PROCEDURE — 97140 MANUAL THERAPY 1/> REGIONS: CPT | Mod: GO | Performed by: OCCUPATIONAL THERAPIST

## 2019-01-16 NOTE — PROGRESS NOTES
Progress Note - Hand Therapy  Reporting period is 11/21/18 to 1/16/19.    Subjectve:   Subjective changes as noted by patient:  I've been using it more without the splint, so a little sore.   Functional changes noted by patient:  Improvement in Self Care Tasks (dressing, eating), Recreational Activities, Household Chores and Driving   Patient has noted adverse reaction to:  None    Objective:  Changes noted in objective findings: Yes, see below    Pain Level Report: On scale 0-10/10  Date 11/21/2018 1/16/19   side L L   Overall 2 2   At Rest 0 0   With Activity 2 4     Primary Report: location and description  Date 11/21/2018 1/16/19   Side L L   Location Ulnar wrist, forearm Ulnar wrist, TFCC   Radiation no no   Pain Quality Achy, dull Sharp   Frequency intermittent Intermittant   Duration With motion motion   Exacerbated by  Lifting, gripping, twisting, pinching, pulling Overusing it   Relieved by Rest Rest   Progression since onset Gradually improving Gradually improving     Scar: mild adherence    Edema: Measured circumferentially in cm  Date 11/21/2018 11/21/2018   Side R L   Distal Wrist Crease 16 17     Range of Motion Wrist AROM (PROM):  Date 11/21/2018 11/21/2018 11/30 12/7/18 12/14/18 12/19/18 1/2/19 1/9/19 1/16/19   Side R L L L L L L L L   Ext 68 45 45 52 56 58 55 60 56   Flex 85 25 60 65 65 65 65 (75 post) 75 77   UD 48 NT 33 33 26 25 27     RD 25 NT 10 13 13 12 11     Sup 85 85 WNL WNL WNL NT NT  WNL   Pro 80 -20 (not able to get to neutral) 20 18 20 NT NT  0 neutral     ROM:  Pain Report:  - none    + mild    ++ moderate    +++ severe   Elbow  11/21/2018 11/30   AROM(PROM) R L L   Extension 0 -20 0   Flexion 145 135 143     ROM of fingers and thumb: WNL, very mild stiffness/pulling sensation noted, but able to form full fist and oppose to DPC of small finger    STRENGTH: (Measured in pounds, pain scale 0-10/10)    Date 1/16/2019        Trials R L             1 73 35             2               3                Avg               Pain  1               3 Point Pinch  Date 1/16/2019        Trials R L             1 18 12             2               3               Avg               Pain                 Lateral Pinch  Date 1/16/2019        Trials R L             1 16 13             2               3               Avg               Pain                     Please refer to the daily flowsheet for treatment today, total treatment time and time spent performing 1:1 timed codes.       Assessment:  Response to therapy has been improvement to:  ROM of Wrist:  All Planes  Strength:   and pinch  Pain:  frequency is less and intensity of pain is decreased    Overall Assessment:  Patient's symptoms are resolving.  Patient would benefit from continued therapy to achieve rehab potential  STG/LTG:  STGoals have been reviewed;  see goal sheet for details and updates.  LTGoals have been reviewed;  see goal sheet for details and updates.    I have re-evaluated this patient and find that the nature, scope, duration and intensity of the therapy is appropriate for the medical condition of the patient.      Plan:  Frequency:  1 X week, once daily  Duration:  for 8 weeks    Home Program:   Exercise:   PROM pronation  Wrist stability  Strengthening    Activity:   Avoid activities that exacerbate pain.     Next Visit:  MFR   AAROM - Pronation  Progress wrist stability  Strengthening

## 2019-01-23 ENCOUNTER — THERAPY VISIT (OUTPATIENT)
Dept: OCCUPATIONAL THERAPY | Facility: CLINIC | Age: 24
End: 2019-01-23
Payer: COMMERCIAL

## 2019-01-23 DIAGNOSIS — Z47.89 AFTERCARE FOLLOWING SURGERY OF THE MUSCULOSKELETAL SYSTEM: ICD-10-CM

## 2019-01-23 DIAGNOSIS — S52.302A: ICD-10-CM

## 2019-01-23 DIAGNOSIS — M25.532 LEFT WRIST PAIN: ICD-10-CM

## 2019-01-23 PROCEDURE — 97140 MANUAL THERAPY 1/> REGIONS: CPT | Mod: GO | Performed by: OCCUPATIONAL THERAPIST

## 2019-01-23 PROCEDURE — 97110 THERAPEUTIC EXERCISES: CPT | Mod: GO | Performed by: OCCUPATIONAL THERAPIST

## 2019-01-23 NOTE — PROGRESS NOTES
Hand Therapy Objective note 1/23/19    Objective:  Changes noted in objective findings: Yes, see below    Pain Level Report: On scale 0-10/10  Date 11/21/2018 1/16/19   side L L   Overall 2 2   At Rest 0 0   With Activity 2 4     Primary Report: location and description  Date 11/21/2018 1/16/19   Side L L   Location Ulnar wrist, forearm Ulnar wrist, TFCC   Radiation no no   Pain Quality Achy, dull Sharp   Frequency intermittent Intermittant   Duration With motion motion   Exacerbated by  Lifting, gripping, twisting, pinching, pulling Overusing it   Relieved by Rest Rest   Progression since onset Gradually improving Gradually improving     Scar: mild adherence    Edema: Measured circumferentially in cm  Date 11/21/2018 11/21/2018   Side R L   Distal Wrist Crease 16 17     Range of Motion Wrist AROM (PROM):  Date 11/21/2018 11/21/2018 11/30 12/7/18 12/14/18 12/19/18 1/2/19 1/9/19 1/16/19 1/23/19   Side R L L L L L L L L    Ext 68 45 45 52 56 58 55 60 56    Flex 85 25 60 65 65 65 65 (75 post) 75 77    UD 48 NT 33 33 26 25 27      RD 25 NT 10 13 13 12 11      Sup 85 85 WNL WNL WNL NT NT  WNL WNL   Pro 80 -20 (not able to get to neutral) 20 18 20 NT NT  0 neutral 30     ROM:  Pain Report:  - none    + mild    ++ moderate    +++ severe   Elbow  11/21/2018 11/30   AROM(PROM) R L L   Extension 0 -20 0   Flexion 145 135 143     ROM of fingers and thumb: WNL, very mild stiffness/pulling sensation noted, but able to form full fist and oppose to DPC of small finger    STRENGTH: (Measured in pounds, pain scale 0-10/10)    Date 1/16/2019        Trials R L             1 73 35             2               3               Avg               Pain  1               3 Point Pinch  Date 1/16/2019        Trials R L             1 18 12             2               3               Avg               Pain                 Lateral Pinch  Date 1/16/2019        Trials R L             1 16 13             2               3               Avg                Pain                   Home Program:   Exercise:   PROM pronation  Wrist stability  Strengthening    Activity:   Avoid activities that exacerbate pain.     Next Visit:  MFR   Forearm mobs to radial head  AAROM - Pronation  Progress wrist stability  Strengthening

## 2019-02-06 ENCOUNTER — THERAPY VISIT (OUTPATIENT)
Dept: OCCUPATIONAL THERAPY | Facility: CLINIC | Age: 24
End: 2019-02-06
Payer: COMMERCIAL

## 2019-02-06 DIAGNOSIS — S52.302A: ICD-10-CM

## 2019-02-06 DIAGNOSIS — Z47.89 AFTERCARE FOLLOWING SURGERY OF THE MUSCULOSKELETAL SYSTEM: ICD-10-CM

## 2019-02-06 DIAGNOSIS — M25.532 LEFT WRIST PAIN: ICD-10-CM

## 2019-02-06 PROCEDURE — 97760 ORTHOTIC MGMT&TRAING 1ST ENC: CPT | Mod: GO | Performed by: OCCUPATIONAL THERAPIST

## 2019-02-06 NOTE — PROGRESS NOTES
Hand Therapy Objective note 2/6/19    Objective:  Changes noted in objective findings: Yes, see below    Pain Level Report: On scale 0-10/10  Date 11/21/2018 1/16/19   side L L   Overall 2 2   At Rest 0 0   With Activity 2 4     Primary Report: location and description  Date 11/21/2018 1/16/19   Side L L   Location Ulnar wrist, forearm Ulnar wrist, TFCC   Radiation no no   Pain Quality Achy, dull Sharp   Frequency intermittent Intermittant   Duration With motion motion   Exacerbated by  Lifting, gripping, twisting, pinching, pulling Overusing it   Relieved by Rest Rest   Progression since onset Gradually improving Gradually improving     Scar: mild adherence    Edema: Measured circumferentially in cm  Date 11/21/2018 11/21/2018   Side R L   Distal Wrist Crease 16 17     Range of Motion Wrist AROM (PROM):  Date 11/21/2018 11/21/2018 11/30 12/7/18 12/14/18 12/19/18 1/2/19 1/9/19 1/16/19 1/23/19 2/6/19   Side R L L L L L L L L     Ext 68 45 45 52 56 58 55 60 56     Flex 85 25 60 65 65 65 65 (75 post) 75 77     UD 48 NT 33 33 26 25 27       RD 25 NT 10 13 13 12 11       Sup 85 85 WNL WNL WNL NT NT  WNL WNL WNL   Pro 80 -20 (not able to get to neutral) 20 18 20 NT NT  0 neutral 30 33     ROM:  Pain Report:  - none    + mild    ++ moderate    +++ severe   Elbow  11/21/2018 11/30   AROM(PROM) R L L   Extension 0 -20 0   Flexion 145 135 143     ROM of fingers and thumb: WNL, very mild stiffness/pulling sensation noted, but able to form full fist and oppose to DPC of small finger    STRENGTH: (Measured in pounds, pain scale 0-10/10)    Date 1/16/2019        Trials R L             1 73 35             2               3               Avg               Pain  1               3 Point Pinch  Date 1/16/2019        Trials R L             1 18 12             2               3               Avg               Pain                 Lateral Pinch  Date 1/16/2019        Trials R L             1 16 13             2               3                Hubertg               Pain                   Home Program:   Exercise:   PROM pronation  Wrist stability  Strengthening    Activity:   Avoid activities that exacerbate pain.     Next Visit:  Check DRUJ splint effectiveness  Forearm mobs to radial head  AAROM - Pronation  Progress wrist stability  Strengthening  Future - consider fabricating pronation splint

## 2019-02-13 ENCOUNTER — THERAPY VISIT (OUTPATIENT)
Dept: OCCUPATIONAL THERAPY | Facility: CLINIC | Age: 24
End: 2019-02-13
Payer: COMMERCIAL

## 2019-02-13 DIAGNOSIS — Z47.89 AFTERCARE FOLLOWING SURGERY OF THE MUSCULOSKELETAL SYSTEM: ICD-10-CM

## 2019-02-13 DIAGNOSIS — S52.302A: ICD-10-CM

## 2019-02-13 DIAGNOSIS — M25.532 LEFT WRIST PAIN: ICD-10-CM

## 2019-02-13 PROCEDURE — 97140 MANUAL THERAPY 1/> REGIONS: CPT | Mod: GO | Performed by: OCCUPATIONAL THERAPIST

## 2019-02-13 PROCEDURE — 97110 THERAPEUTIC EXERCISES: CPT | Mod: GO | Performed by: OCCUPATIONAL THERAPIST

## 2019-02-13 NOTE — PROGRESS NOTES
Hand Therapy Objective note 2/13/19    Objective:  Changes noted in objective findings: Yes, see below    Pain Level Report: On scale 0-10/10  Date 11/21/2018 1/16/19   side L L   Overall 2 2   At Rest 0 0   With Activity 2 4     Primary Report: location and description  Date 11/21/2018 1/16/19   Side L L   Location Ulnar wrist, forearm Ulnar wrist, TFCC   Radiation no no   Pain Quality Achy, dull Sharp   Frequency intermittent Intermittant   Duration With motion motion   Exacerbated by  Lifting, gripping, twisting, pinching, pulling Overusing it   Relieved by Rest Rest   Progression since onset Gradually improving Gradually improving     Scar: mild adherence    Edema: Measured circumferentially in cm  Date 11/21/2018 11/21/2018   Side R L   Distal Wrist Crease 16 17     Range of Motion Wrist AROM (PROM):  Date 11/21/2018 11/21/2018 11/30 12/7/18 12/14/18 12/19/18 1/2/19 1/9/19 1/16/19 1/23/19 2/6/19    Side R L L L L L L L L      Ext 68 45 45 52 56 58 55 60 56      Flex 85 25 60 65 65 65 65 (75 post) 75 77      UD 48 NT 33 33 26 25 27        RD 25 NT 10 13 13 12 11        Sup 85 85 WNL WNL WNL NT NT  WNL WNL WNL    Pro 80 -20 (not able to get to neutral) 20 18 20 NT NT  0 neutral 30 33      Date      Side L   Pron 39                           ROM:  Pain Report:  - none    + mild    ++ moderate    +++ severe   Elbow  11/21/2018 11/30   AROM(PROM) R L L   Extension 0 -20 0   Flexion 145 135 143     ROM of fingers and thumb: WNL, very mild stiffness/pulling sensation noted, but able to form full fist and oppose to DPC of small finger    STRENGTH: (Measured in pounds, pain scale 0-10/10)    Date 1/16/2019        Trials R L             1 73 35             2               3               Avg               Pain  1               3 Point Pinch  Date 1/16/2019        Trials R L             1 18 12             2               3               Avg               Pain                 Lateral Pinch  Date 1/16/2019         Trials R L             1 16 13             2               3               Avg               Pain                   Home Program:   Exercise:   PROM pronation  Wrist stability  Strengthening    Activity:   Avoid activities that exacerbate pain.     Next Visit:  Check DRUJ splint effectiveness  Forearm mobs to radial head  AAROM - Pronation  Progress wrist stability  Strengthening  Future - consider fabricating pronation splint

## 2019-02-20 ENCOUNTER — THERAPY VISIT (OUTPATIENT)
Dept: OCCUPATIONAL THERAPY | Facility: CLINIC | Age: 24
End: 2019-02-20
Payer: COMMERCIAL

## 2019-02-20 DIAGNOSIS — S52.302A: ICD-10-CM

## 2019-02-20 DIAGNOSIS — Z47.89 AFTERCARE FOLLOWING SURGERY OF THE MUSCULOSKELETAL SYSTEM: ICD-10-CM

## 2019-02-20 DIAGNOSIS — M25.532 LEFT WRIST PAIN: ICD-10-CM

## 2019-02-20 PROCEDURE — 97140 MANUAL THERAPY 1/> REGIONS: CPT | Mod: GO | Performed by: OCCUPATIONAL THERAPIST

## 2019-02-20 PROCEDURE — 97760 ORTHOTIC MGMT&TRAING 1ST ENC: CPT | Mod: GO | Performed by: OCCUPATIONAL THERAPIST

## 2019-02-20 NOTE — PROGRESS NOTES
Hand Therapy Objective note 2/20/19    Objective:      Pain Level Report: On scale 0-10/10  Date 11/21/2018 1/16/19   side L L   Overall 2 2   At Rest 0 0   With Activity 2 4     Primary Report: location and description  Date 11/21/2018 1/16/19   Side L L   Location Ulnar wrist, forearm Ulnar wrist, TFCC   Radiation no no   Pain Quality Achy, dull Sharp   Frequency intermittent Intermittant   Duration With motion motion   Exacerbated by  Lifting, gripping, twisting, pinching, pulling Overusing it   Relieved by Rest Rest   Progression since onset Gradually improving Gradually improving     Scar: mild adherence    Edema: Measured circumferentially in cm  Date 11/21/2018 11/21/2018   Side R L   Distal Wrist Crease 16 17     Range of Motion Wrist AROM (PROM):  Date 11/21/2018 11/21/2018 11/30 12/7/18 12/14/18 12/19/18 1/2/19 1/9/19 1/16/19 1/23/19 2/6/19    Side R L L L L L L L L      Ext 68 45 45 52 56 58 55 60 56      Flex 85 25 60 65 65 65 65 (75 post) 75 77      UD 48 NT 33 33 26 25 27        RD 25 NT 10 13 13 12 11        Sup 85 85 WNL WNL WNL NT NT  WNL WNL WNL    Pro 80 -20 (not able to get to neutral) 20 18 20 NT NT  0 neutral 30 33      Date    2/13/19 2/20/19   Side L L   Pron 39 39                                ROM:  Pain Report:  - none    + mild    ++ moderate    +++ severe   Elbow  11/21/2018 11/30   AROM(PROM) R L L   Extension 0 -20 0   Flexion 145 135 143     ROM of fingers and thumb: WNL, very mild stiffness/pulling sensation noted, but able to form full fist and oppose to DPC of small finger    STRENGTH: (Measured in pounds, pain scale 0-10/10)    Date 1/16/2019        Trials R L             1 73 35             2               3               Avg               Pain  1               3 Point Pinch  Date 1/16/2019        Trials R L             1 18 12             2               3               Avg               Pain                 Lateral Pinch  Date 1/16/2019        Trials R L             1 16  13             2               3               Avg               Pain                   Home Program:   Exercise:   PROM pronation  Wrist stability  Strengthening    Activity:   Avoid activities that exacerbate pain.   Using static progressive splint for stretching into pronation     Next Visit:  Forearm mobs to radial head  AAROM - Pronation  Progress wrist stability  Strengthening

## 2019-02-22 DIAGNOSIS — M79.602 LEFT ARM PAIN: Primary | ICD-10-CM

## 2019-02-27 ENCOUNTER — OFFICE VISIT (OUTPATIENT)
Dept: ORTHOPEDICS | Facility: CLINIC | Age: 24
End: 2019-02-27
Payer: COMMERCIAL

## 2019-02-27 ENCOUNTER — THERAPY VISIT (OUTPATIENT)
Dept: OCCUPATIONAL THERAPY | Facility: CLINIC | Age: 24
End: 2019-02-27
Payer: COMMERCIAL

## 2019-02-27 ENCOUNTER — ANCILLARY PROCEDURE (OUTPATIENT)
Dept: GENERAL RADIOLOGY | Facility: CLINIC | Age: 24
End: 2019-02-27
Attending: ORTHOPAEDIC SURGERY
Payer: COMMERCIAL

## 2019-02-27 VITALS — HEIGHT: 70 IN | BODY MASS INDEX: 27.2 KG/M2 | WEIGHT: 190 LBS

## 2019-02-27 DIAGNOSIS — M79.602 LEFT ARM PAIN: ICD-10-CM

## 2019-02-27 DIAGNOSIS — S52.302A: ICD-10-CM

## 2019-02-27 DIAGNOSIS — S52.372D CLOSED GALEAZZI'S FRACTURE OF LEFT RADIUS WITH ROUTINE HEALING, SUBSEQUENT ENCOUNTER: Primary | ICD-10-CM

## 2019-02-27 DIAGNOSIS — Z47.89 AFTERCARE FOLLOWING SURGERY OF THE MUSCULOSKELETAL SYSTEM: ICD-10-CM

## 2019-02-27 DIAGNOSIS — M25.532 LEFT WRIST PAIN: ICD-10-CM

## 2019-02-27 PROCEDURE — 97140 MANUAL THERAPY 1/> REGIONS: CPT | Mod: GO | Performed by: OCCUPATIONAL THERAPIST

## 2019-02-27 PROCEDURE — 97110 THERAPEUTIC EXERCISES: CPT | Mod: GO | Performed by: OCCUPATIONAL THERAPIST

## 2019-02-27 ASSESSMENT — MIFFLIN-ST. JEOR: SCORE: 1687.08

## 2019-02-27 NOTE — LETTER
"2019       RE: Gela Miles  [address]  [facility] [address]     Dear Colleague,    Thank you for referring your patient, Gela Miles, to the [facility] at [facility]. Please see a copy of my visit note below.    [facility]  Orthopedics  Barron Ybarra MD  2019     Name: Gela Miles  MRN: [id number]  Age: 23 year old  : 1995  Referring provider: Referred Self     Chief Complaint:   Surgical follow up     Date of Surgery: 10/23/2018     Procedure Performed: Open reduction internal fixation left galleazi fx  with TFCC repair      History of Present Illness:   Geal Miles is a 23 year old female 4 months status post the above procedure who presents for postoperative evaluation. She has been working hard on pronation and notes that she feels like she is making good progress in hand therapy. She has very mild twinges of pain, rated a 1-2/10 in severity, over the ulnar side of the wrist while doing stretches and exercises. No other complaints. No radial sided pain.     Physical Examination:  Ht 1.778 m (5' 10\")   Wt 86.2 kg (190 lb)   BMI 27.26 kg/m     General: Healthy appearing female. Affect appropriate. Normal gait. Alert and oriented to surroundings.   Right Upper Extremity:   Incisions well healed.   No pain over the fracture site. Mild tenderness over the fovea. Very mild increased anterior and posterior translation of the DRUJ in the supinated position but symmetric in pronation   Fingers wwp.   AROM:   60   of pronation   85   of supination   60   of wrist extension   90   of wrist flexion      Radiographs:   Radiographs of the left forearm - 2 views (2019):  Progression of healing of left ulnar shaft fx    I have independently reviewed the above imaging studies; the results were discussed with the patient.     Assessment:   23 year old female s/p ORIF left radial shaft and TFCC repair, progressing as expected.     Plan: "     She will continue working on motion in hand therapy. Once she feels like progress has plateaued, she can discontinue.     No lifting restrictions at this point. Progress activities as tolerated. Avoid painful activities.    Follow up in 2 months for recheck.     Scribe Disclosure:   I, Erna Tinajero, am serving as a scribe to document services personally performed by Barron Ybarra MD at this visit, based upon the provider's statements to me. All documentation has been reviewed by the aforementioned provider prior to being entered into the official medical record.    Again, thank you for allowing me to participate in the care of your patient.      Sincerely,    Barron Ybarra MD

## 2019-02-27 NOTE — PROGRESS NOTES
Hand Therapy Objective note 2/26/19    Objective:      Pain Level Report: On scale 0-10/10  Date 11/21/2018 1/16/19   side L L   Overall 2 2   At Rest 0 0   With Activity 2 4     Primary Report: location and description  Date 11/21/2018 1/16/19   Side L L   Location Ulnar wrist, forearm Ulnar wrist, TFCC   Radiation no no   Pain Quality Achy, dull Sharp   Frequency intermittent Intermittant   Duration With motion motion   Exacerbated by  Lifting, gripping, twisting, pinching, pulling Overusing it   Relieved by Rest Rest   Progression since onset Gradually improving Gradually improving     Scar: mild adherence    Edema: Measured circumferentially in cm  Date 11/21/2018 11/21/2018   Side R L   Distal Wrist Crease 16 17     Range of Motion Wrist AROM (PROM):  Date 11/21/2018 11/21/2018 11/30 12/7/18 12/14/18 12/19/18 1/2/19 1/9/19 1/16/19 1/23/19 2/6/19    Side R L L L L L L L L      Ext 68 45 45 52 56 58 55 60 56      Flex 85 25 60 65 65 65 65 (75 post) 75 77      UD 48 NT 33 33 26 25 27        RD 25 NT 10 13 13 12 11        Sup 85 85 WNL WNL WNL NT NT  WNL WNL WNL    Pro 80 -20 (not able to get to neutral) 20 18 20 NT NT  0 neutral 30 33      Date    2/13/19 2/20/19 2/27/19   Side L L L   Pron 39 39 46                                     ROM:  Pain Report:  - none    + mild    ++ moderate    +++ severe   Elbow  11/21/2018 11/30   AROM(PROM) R L L   Extension 0 -20 0   Flexion 145 135 143     ROM of fingers and thumb: WNL, very mild stiffness/pulling sensation noted, but able to form full fist and oppose to DPC of small finger    STRENGTH: (Measured in pounds, pain scale 0-10/10)    Date 1/16/2019        Trials R L             1 73 35             2               3               Avg               Pain  1               3 Point Pinch  Date 1/16/2019        Trials R L             1 18 12             2               3               Avg               Pain                 Lateral Pinch  Date 1/16/2019        Trials R L              1 16 13             2               3               Avg               Pain                   Home Program:   Exercise:   PROM pronation  Wrist stability  Strengthening  Pronation splint    Activity:   Avoid activities that exacerbate pain.   Using static progressive splint for stretching into pronation     Next Visit:  Forearm mobs to radial head  AAROM - Pronation  Progress wrist stability  Strengthening

## 2019-02-27 NOTE — NURSING NOTE
"Reason For Visit:   Chief Complaint   Patient presents with     Left Hand - Surgical Followup     Surgical Followup     4 mo pop DOS 10/23/10 Open Reduction Internal Fixation Left Radial Shaft Fracture, Possible Triangular Fibrocartilage Complex Repair - Left       Primary MD: Niko Khanna  Ref. MD: rodrick    ?  No    Age: 23 year old        Date of surgery: 10/23/19  Type of surgery: Open Reduction Internal Fixation Left Radial Shaft Fracture, Possible Triangular Fibrocartilage Complex Repair - Left        Ht 1.778 m (5' 10\")   Wt 86.2 kg (190 lb)   BMI 27.26 kg/m        Pain Assessment  Patient Currently in Pain: Yes  0-10 Pain Scale: 2               QuickDASH Assessment  QuickDASH Main 12/17/2018   1.Open a tight or new jar. Moderate difficulty   2. Do heavy household chores (e.g., wash walls, floors) Moderate difficulty   3. Carry a shopping bag or briefcase. Mild difficulty   4. Wash your back. Severe difficulty   5. Use a knife to cut food. Mild difficulty   6. Recreational activities in which you take some force or impact through your arm, shoulder or hand (e.g., golf, hammering, tennis, etc.). Severe difficulty   7. During the past week, to what extent has your arm, shoulder or hand problem interfered with your normal social activities with family, friends, neighbours or groups? Slightly   8. During the past week, were you limited in your work or other regular daily activities as a result of your arm, shoulder or hand problem? Slightly limited   9. Arm, shoulder or hand pain. Mild   10.Tingling (pins and needles) in your arm,shoulder or hand. None   11. During the past week, how much difficulty have you had sleeping because of the pain in your arm, shoulder or hand? (Eek number) Mild difficulty   Quickdash Ability Score 36.36          No Known Allergies    Kaitlin Tucker ATC    "

## 2019-02-27 NOTE — PROGRESS NOTES
"[facility]  Orthopedics  Barron Ybarra MD  2019     Name: Gela Miles  MRN: [id number]  Age: 23 year old  : 1995  Referring provider: Referred Self     Chief Complaint:   Surgical follow up     Date of Surgery: 10/23/2018     Procedure Performed: Open reduction internal fixation left galleazi fx with TFCC repair      History of Present Illness:   Gela Miles is a 23 year old female 4 months status post the above procedure who presents for postoperative evaluation. She has been working hard on pronation and notes that she feels like she is making good progress in hand therapy. She has very mild twinges of pain, rated a 1-2/10 in severity, over the ulnar side of the wrist while doing stretches and exercises. No other complaints. No radial sided pain.     Physical Examination:  Ht 1.778 m (5' 10\")   Wt 86.2 kg (190 lb)   BMI 27.26 kg/m    General: Healthy appearing female. Affect appropriate. Normal gait. Alert and oriented to surroundings.   Right Upper Extremity:   Incisions well healed.   No pain over the fracture site. Mild tenderness over the fovea. Very mild increased anterior and posterior translation of the DRUJ in the supinated position but symmetric in pronation   Fingers wwp.   AROM:   60   of pronation   85   of supination   60   of wrist extension   90   of wrist flexion      Radiographs:   Radiographs of the left forearm - 2 views (2019):  Progression of healing of left ulnar shaft fx    I have independently reviewed the above imaging studies; the results were discussed with the patient.     Assessment:   23 year old female s/p ORIF left radial shaft and TFCC repair, progressing as expected.     Plan:     She will continue working on motion in hand therapy. Once she feels like progress has plateaued, she can discontinue.     No lifting restrictions at this point. Progress activities as tolerated. Avoid painful activities.    Follow up in 2 months for recheck.       Barron COELHO" MD [place]    Scribe Disclosure:   I, Erna Tinajero, am serving as a scribe to document services personally performed by Barron Ybarra MD at this visit, based upon the provider's statements to me. All documentation has been reviewed by the aforementioned provider prior to being entered into the official medical record.

## 2019-03-08 ENCOUNTER — THERAPY VISIT (OUTPATIENT)
Dept: OCCUPATIONAL THERAPY | Facility: CLINIC | Age: 24
End: 2019-03-08
Payer: COMMERCIAL

## 2019-03-08 DIAGNOSIS — S52.302A: ICD-10-CM

## 2019-03-08 DIAGNOSIS — Z47.89 AFTERCARE FOLLOWING SURGERY OF THE MUSCULOSKELETAL SYSTEM: ICD-10-CM

## 2019-03-08 DIAGNOSIS — M25.532 LEFT WRIST PAIN: ICD-10-CM

## 2019-03-08 PROCEDURE — 97140 MANUAL THERAPY 1/> REGIONS: CPT | Mod: GO | Performed by: OCCUPATIONAL THERAPIST

## 2019-03-08 PROCEDURE — 97110 THERAPEUTIC EXERCISES: CPT | Mod: GO | Performed by: OCCUPATIONAL THERAPIST

## 2019-03-08 NOTE — PROGRESS NOTES
Objective info 3/8/19    Range of Motion Wrist AROM (PROM):  Date 11/21/2018 11/21/2018 11/30 12/7/18 12/14/18 12/19/18 1/2/19 1/9/19 1/16/19 1/23/19 2/6/19    Side R L L L L L L L L      Ext 68 45 45 52 56 58 55 60 56      Flex 85 25 60 65 65 65 65 (75 post) 75 77      UD 48 NT 33 33 26 25 27        RD 25 NT 10 13 13 12 11        Sup 85 85 WNL WNL WNL NT NT  WNL WNL WNL    Pro 80 -20 (not able to get to neutral) 20 18 20 NT NT  0 neutral 30 33      Date    2/13/19 2/20/19 2/27/19 3/8/19     Side L L L L     Pron 39 39 46 67                                                      Please refer to the daily flowsheet for treatment today, total treatment time and time spent performing 1:1 timed codes.

## 2019-03-20 ENCOUNTER — THERAPY VISIT (OUTPATIENT)
Dept: OCCUPATIONAL THERAPY | Facility: CLINIC | Age: 24
End: 2019-03-20
Payer: COMMERCIAL

## 2019-03-20 DIAGNOSIS — Z47.89 AFTERCARE FOLLOWING SURGERY OF THE MUSCULOSKELETAL SYSTEM: ICD-10-CM

## 2019-03-20 DIAGNOSIS — S52.302A: ICD-10-CM

## 2019-03-20 DIAGNOSIS — M25.532 LEFT WRIST PAIN: ICD-10-CM

## 2019-03-20 PROCEDURE — 97110 THERAPEUTIC EXERCISES: CPT | Mod: GO | Performed by: OCCUPATIONAL THERAPIST

## 2019-03-20 PROCEDURE — 97140 MANUAL THERAPY 1/> REGIONS: CPT | Mod: GO | Performed by: OCCUPATIONAL THERAPIST

## 2019-03-20 NOTE — PROGRESS NOTES
Objective info 3/20/19    Range of Motion Wrist AROM (PROM):  Date 11/21/2018 11/21/2018 11/30 12/7/18 12/14/18 12/19/18 1/2/19 1/9/19 1/16/19 1/23/19 2/6/19    Side R L L L L L L L L      Ext 68 45 45 52 56 58 55 60 56      Flex 85 25 60 65 65 65 65 (75 post) 75 77      UD 48 NT 33 33 26 25 27        RD 25 NT 10 13 13 12 11        Sup 85 85 WNL WNL WNL NT NT  WNL WNL WNL    Pro 80 -20 (not able to get to neutral) 20 18 20 NT NT  0 neutral 30 33      Date    2/13/19 2/20/19 2/27/19 3/8/19 3/20/19    Side L L L L L    Pron 39 39 46 67 73                                                     Please refer to the daily flowsheet for treatment today, total treatment time and time spent performing 1:1 timed codes.

## 2019-03-27 ENCOUNTER — OFFICE VISIT (OUTPATIENT)
Dept: OBGYN | Facility: CLINIC | Age: 24
End: 2019-03-27
Attending: NURSE PRACTITIONER
Payer: COMMERCIAL

## 2019-03-27 VITALS — BODY MASS INDEX: 26.36 KG/M2 | HEIGHT: 69 IN | WEIGHT: 178 LBS | HEART RATE: 87 BPM

## 2019-03-27 DIAGNOSIS — Z12.4 SCREENING FOR MALIGNANT NEOPLASM OF CERVIX: ICD-10-CM

## 2019-03-27 DIAGNOSIS — Z00.00 VISIT FOR PREVENTIVE HEALTH EXAMINATION: Primary | ICD-10-CM

## 2019-03-27 DIAGNOSIS — Z23 NEED FOR PROPHYLACTIC VACCINATION AND INOCULATION AGAINST INFLUENZA: ICD-10-CM

## 2019-03-27 DIAGNOSIS — Z01.419 ENCOUNTER FOR GYNECOLOGICAL EXAMINATION WITHOUT ABNORMAL FINDING: ICD-10-CM

## 2019-03-27 DIAGNOSIS — E55.9 VITAMIN D DEFICIENCY: ICD-10-CM

## 2019-03-27 PROCEDURE — 36415 COLL VENOUS BLD VENIPUNCTURE: CPT | Performed by: NURSE PRACTITIONER

## 2019-03-27 PROCEDURE — G0145 SCR C/V CYTO,THINLAYER,RESCR: HCPCS | Performed by: NURSE PRACTITIONER

## 2019-03-27 PROCEDURE — 25000128 H RX IP 250 OP 636: Mod: ZF

## 2019-03-27 PROCEDURE — 82306 VITAMIN D 25 HYDROXY: CPT | Performed by: NURSE PRACTITIONER

## 2019-03-27 PROCEDURE — 90686 IIV4 VACC NO PRSV 0.5 ML IM: CPT | Mod: ZF

## 2019-03-27 PROCEDURE — G0008 ADMIN INFLUENZA VIRUS VAC: HCPCS | Mod: ZF

## 2019-03-27 PROCEDURE — G0463 HOSPITAL OUTPT CLINIC VISIT: HCPCS | Mod: ZF

## 2019-03-27 RX ORDER — CHOLECALCIFEROL (VITAMIN D3) 50 MCG
1 TABLET ORAL DAILY
COMMUNITY

## 2019-03-27 SDOH — HEALTH STABILITY: MENTAL HEALTH: HOW OFTEN DO YOU HAVE 6 OR MORE DRINKS ON ONE OCCASION?: NEVER

## 2019-03-27 SDOH — HEALTH STABILITY: MENTAL HEALTH: HOW OFTEN DO YOU HAVE A DRINK CONTAINING ALCOHOL?: 2-4 TIMES A MONTH

## 2019-03-27 SDOH — HEALTH STABILITY: MENTAL HEALTH: HOW MANY STANDARD DRINKS CONTAINING ALCOHOL DO YOU HAVE ON A TYPICAL DAY?: 1 OR 2

## 2019-03-27 ASSESSMENT — PAIN SCALES - GENERAL: PAINLEVEL: NO PAIN (0)

## 2019-03-27 ASSESSMENT — MIFFLIN-ST. JEOR: SCORE: 1608.84

## 2019-03-27 NOTE — LETTER
"3/27/2019       RE: Gela Miles  [address]  [facility] [address]     Dear Colleague,    Thank you for referring your patient, Gela Miles, to the [facility] at [facility]. Please see a copy of my visit note below.  Progress Note    SUBJECTIVE:  Gela Miles is an 23 year old, , who requests an Annual Preventive Exam.     Concerns today include:   1. Mirena IUD placed 2013: Presents for replacement today. Very pleased with this method. Has irregular menses; denies prolonged bleeding.    2. Hx of Vitamin D deficiency: Currently taking a 2000 international unit supplement today.  Started this in 2018.  Interested in rechecking Vitamin D level.    3. Request for flu vaccine today    Sexual hx:   - 1 male partner; in monogamous relationship  - Declines STD testing  - Denies sexual hx     Social Hx:  - Lives with her partner, Flo.  They both are [employer] graduate students at the  of . They will finish school 2020. Originally from [place], [place]. Has 4 siblings.     Mental Health:  - States she has symptoms of anxiety, primarily triggered by school. Sets high standards for herself and becomes \"very fixated\" on grades. Has found meditation, exercise, and walking her dog to be very helpful. Never formally diagnosed with anxiety. States her symptoms are manageable.     Exercise: bikes to school when weather is nice; exercises several times per week when time allows     Diet: takes vitamin D supplement; eats yogurt and almond milk as sources of calcium; takes tums as calcium supplement    Last pap smear: 5 yrs ago, normal    Menstrual History:  Menstrual History 10/23/2018 3/27/2019   LAST MENSTRUAL PERIOD 10/5/2018 -   Menarche Age - 11   Period Cycle (Days) - no period on iud   Method of Contraception - Mirena IUD   Reviewed Today - Yes     Mammogram current: not applicable - no fam hx of breast cancer    Last " Colonoscopy: not applicable - no fam hx of colon cancer     HISTORY:    Current Outpatient Medications on File Prior to Visit:  levonorgestrel (MIRENA) 20 MCG/24HR IUD 1 each by Intrauterine route once   vitamin D3 (CHOLECALCIFEROL) 2000 units tablet Take 1 tablet by mouth daily     No current facility-administered medications on file prior to visit.   No Known Allergies  There is no immunization history for the selected administration types on file for this patient.    Obstetric History       T0      L0     SAB0   TAB0   Ectopic0   Multiple0   Live Births0      Past Medical History:   Diagnosis Date     Broken arm 10/01/2018    left arm     Vitamin D deficiency      Past Surgical History:   Procedure Laterality Date     OPEN REDUCTION INTERNAL FIXATION WRIST Left 10/23/2018    Procedure: Open Reduction Internal Fixation Left Radial Shaft Fracture, Possible Triangular Fibrocartilage Complex Repair;  Surgeon: Barron Ybarra MD;  Location: [facility] OR     Family History   Problem Relation Age of Onset     Myocardial Infarction Mother      Hypertension Mother      Ovarian Cancer Maternal Grandmother      Cardiac Sudden Death Paternal Grandfather      Obesity Brother      Obesity Sister      Breast Cancer No family hx of      Colon Cancer No family hx of      Diabetes No family hx of      Social History     Socioeconomic History     Marital status: Single     Spouse name: None     Number of children: None     Years of education: None     Highest education level: None   Occupational History     None   Social Needs     Financial resource strain: None     Food insecurity:     Worry: None     Inability: None     Transportation needs:     Medical: None     Non-medical: None   Tobacco Use     Smoking status: Former Smoker     Smokeless tobacco: Never Used   Substance and Sexual Activity     Alcohol use: Yes     Frequency: 2-4 times a month     Drinks per session: 1 or 2     Binge frequency: Never     Comment:  "Socially     Drug use: No     Sexual activity: Yes     Partners: Male     Birth control/protection: IUD   Lifestyle     Physical activity:     Days per week: None     Minutes per session: None     Stress: None   Relationships     Social connections:     Talks on phone: None     Gets together: None     Attends Advent service: None     Active member of club or organization: None     Attends meetings of clubs or organizations: None     Relationship status: None     Intimate partner violence:     Fear of current or ex partner: None     Emotionally abused: None     Physically abused: None     Forced sexual activity: None   Other Topics Concern     None   Social History Narrative    How much exercise per week? 3 days a week 230 min    How much calcium per day? In foods and tums       How much caffeine per day? 2 cups    How much vitamin D per day? suppelemtns    Do you/your family wear seatbelts?  Yes    Do you/your family use safety helmets? Yes    Do you/your family use sunscreen? Yes    Do you/your family keep firearms in the home? No    Do you/your family have a smoke detector(s)? Yes        reveiwed [facility] 3-         ROS  ROS: 10 point ROS neg other than the symptoms noted above in the HPI.    No flowsheet data found.  No flowsheet data found.      EXAM:  Pulse 87, height 1.74 m (5' 8.5\"), weight 80.7 kg (178 lb). Body mass index is 26.67 kg/m .  General - pleasant female in no acute distress.  Skin - no suspicious lesions or rashes  EENT-  PERRLA, euthyroid with out palpable nodules  Neck - supple without lymphadenopathy.  Lungs - clear to auscultation bilaterally.  Heart - regular rate and rhythm without murmur.  Abdomen - soft, nontender, nondistended, no masses or organomegaly noted.  Musculoskeletal - no gross deformities.  Neurological - normal strength, sensation, and mental status.    Breast Exam:  Breast: Without visible skin changes. No dimpling or lesions seen.   Breasts supple, non-tender " with palpation, no dominant mass, nodularity, or nipple discharge noted bilaterally. Axillary nodes negative.      Pelvic Exam:  EG/BUS: Normal genital architecture without lesions, erythema or abnormal secretions. Bartholin's, Urethra, Howell's glands are normal.   Urethral meatus: normal   Urethra: no masses, tenderness, or scarring   Bladder: no masses or tenderness   Vagina: moist, pink, rugae with creamy, white and odorless secretions  Cervix: Nulliparous, and pink, moist, closed, without lesion or CMT; IUD strings extend 2-3cm from the external cervical os  Uterus: anteverted, and small, smooth, firm, mobile w/o pain  Adnexa: Within normal limits and No masses, nodularity, tenderness  Rectum: anus normal       ASSESSMENT:  Encounter Diagnoses   Name Primary?     Visit for preventive health examination Yes     Need for prophylactic vaccination and inoculation against influenza      Screening for malignant neoplasm of cervix      Encounter for gynecological examination without abnormal finding      Vitamin D deficiency         PLAN:   Orders Placed This Encounter   Procedures     Pelvic and Breast Exam Procedure []     Pap Smear Exam [] Do Not Remove     HC FLU VAC PRESRV FREE QUAD SPLIT VIR 3+YRS IM     Pap imaged thin layer screen reflex to HPV if ASCUS - recommended age 25 - 29 years     25- [place]-Vitamin D     Orders Placed This Encounter   Medications     vitamin D3 (CHOLECALCIFEROL) 2000 units tablet     Sig: Take 1 tablet by mouth daily     Recheck vitamin D level; Cholesterol testing also to be completed today. Results will be sent by [employer].  Flu vaccine administered today  Pap smear done today  Offered STD testing, patient declined  Counseled patient on length of efficacy of Mirena IUD.  There is sufficient newer research that supports that the Mirena IUD is effective for up to 6 yrs. Patient opted to leave the IUD in place at this time and will plan removal and reinsertion closer to 12/2019 (6  yrs).  Encouraged her to take 600mg ibuprofen prior to the appointment.     Additional teaching done at this visit regarding calcium (1200 mg per day), self breast exam, exercise, birth control, mental health and weight/diet.    Return to clinic in one year.  Follow-up as needed.    Mamie Carey, DNP, APRN, WHNP

## 2019-03-27 NOTE — PATIENT INSTRUCTIONS
Pap smear was done today  Flu vaccine was administered today   Go to the lab to have your cholesterol and vitamin D levels checked.  You will be notified of results by [employer].    You may schedule IUD removal and reinsertion when you desire. There are many studies to support that the Mirena IUD is effective for up to 6 yrs (this would be 12/2019 based on insertion date of 12/2013).  You can schedule the removal and insertion anytime before that date.  Take 600mg ibuprofen prior to insertion appointment.    Check immunization records to see if you have received the 3 HPV vaccines.    Calcium recommendation: 1200mg daily      PREVENTIVE HEALTH RECOMMENDATIONS:   Most women need a yearly breast and pelvic exam.    A PAP screen, a test done DURING a pelvic exam, is NO longer recommended yearly.    March 2013, screening guidelines recommended by ACOG for PAP screen are:    1) First pap at age 21.    2) Pap every 3 years until age 30.    3) After age 30, pap every 3 years or Pap with HR HPV screen every 5 years until age 65.  4) Women do NOT need a vaginal Pap screen after a hysterectomy (surgical removal of the uterus) when they have not had cancer.    Exceptions:  1) Yearly pap if HIV+ or immunosuppressed secondary to organ transplant  2) YUE II-III continue routine screening for 20 years.    I encourage you continue looking for opportunities to choose a healthy lifestyle:       * Choose to eat a heart healthy diet. Check out the FOOD PLATE guidelines at: http://www.choosemyplate.gov/ for helpful hints on weight and cholesterol management.  Balance your caloric intake with exercise to maintain a BMI in the 22 to 26 range. For bone health: Eat calcium-rich foods like yogurt, broccoli or take chewable calcium pills (500 to 600 mg) twice a day with food.       * Exercise for at least an average of 30 minutes a day, 5 days of the week. This will help you control your weight, release stress, and help prevent disease.      *  Take a Vitamin D3 supplement daily fall through spring and during summer unless you [id number]' full body sun exposure to skin without sunscreen.      * DO wear sunscreen to prevent skin cancer after the first 15-30 minutes.      * Identify stressors in your life, find ways to release the stress, and, make time for yourself. PLEASE ask for help if mood changes last longer than two weeks.     * Limit alcohol to one drink per day.  No smoking.  Avoid second hand smoke. If you smoke, ask for help to stop.       *  If you are in a sexual relationship, talk with your partner about possible infection risks and take action to protect yourself from exposure to a sexual infection.    Please request an infection screen for STIs (sexually transmitted infections) if you are less than age 26 OR believe that you may be at risk.     Get a flu shot each year. Get a tetanus shot every 10 years. EVERYONE needs a pertussis (Whooping cough) booster.    See your dentist twice a year for an exam and preventive care cleaning.     Consider the following screen tests:    1) cholesterol test every 5 years.     2) yearly mammogram after age 40 unless you have identified risks.    3) colonoscopy every 10 years after age 50 unless you have identified risks.    4) diabetes blood test screening if you are at risk for diabetes.      Additional information that you may also find helpful:  The Internet now gives us access to LOTS of information -- some of it helpful, research documented and also plenty of harmful, anecdotal information that may not pertain to your situtaion. Consider visiting the following websites for accurate health information:    www.vitamindcouncil.org/ : Info and ongoing research re Vitamin D    www.fairview.org : Up to date and easily searchable information on multiple topics.    www.medlineplus.gov : medication info, interactive tutorials, watch real surgeries online    www.cdc.gov : public health info, travel advisories,  epidemics (H1N1)    www.eladio/std.org: current research re diagnosis, treatment and prevention of sexually contacted infections.    www.health.[facility].[place].us : [place] dept of heatlh, public health issues in [place], N1N1    www.familydoctor.org : good info from the Academy of Family Physicians

## 2019-03-28 LAB — DEPRECATED CALCIDIOL+CALCIFEROL SERPL-MC: 29 UG/L (ref 20–75)

## 2019-03-29 LAB
COPATH REPORT: NORMAL
PAP: NORMAL

## 2019-04-05 ENCOUNTER — THERAPY VISIT (OUTPATIENT)
Dept: OCCUPATIONAL THERAPY | Facility: CLINIC | Age: 24
End: 2019-04-05
Payer: COMMERCIAL

## 2019-04-05 DIAGNOSIS — S52.302A: ICD-10-CM

## 2019-04-05 DIAGNOSIS — M25.532 LEFT WRIST PAIN: ICD-10-CM

## 2019-04-05 DIAGNOSIS — Z47.89 AFTERCARE FOLLOWING SURGERY OF THE MUSCULOSKELETAL SYSTEM: ICD-10-CM

## 2019-04-05 PROCEDURE — 97140 MANUAL THERAPY 1/> REGIONS: CPT | Mod: GO | Performed by: OCCUPATIONAL THERAPIST

## 2019-04-05 PROCEDURE — 97110 THERAPEUTIC EXERCISES: CPT | Mod: GO | Performed by: OCCUPATIONAL THERAPIST

## 2019-04-05 NOTE — PROGRESS NOTES
Objective info 3/8/19    Range of Motion Wrist AROM (PROM):  Date 11/21/2018 11/21/2018 11/30 12/7/18 12/14/18 12/19/18 1/2/19 1/9/19 1/16/19 1/23/19 2/6/19    Side R L L L L L L L L      Ext 68 45 45 52 56 58 55 60 56      Flex 85 25 60 65 65 65 65 (75 post) 75 77      UD 48 NT 33 33 26 25 27        RD 25 NT 10 13 13 12 11        Sup 85 85 WNL WNL WNL NT NT  WNL WNL WNL    Pro 80 -20 (not able to get to neutral) 20 18 20 NT NT  0 neutral 30 33      Date    2/13/19 2/20/19 2/27/19 3/8/19 4/5/19    Side L L L L L    Pron 39 39 46 67 74                                                     Please refer to the daily flowsheet for treatment today, total treatment time and time spent performing 1:1 timed codes.

## 2019-04-18 DIAGNOSIS — S52.509A CLOSED FRACTURE OF LOWER END OF RADIUS AND ULNA: Primary | ICD-10-CM

## 2019-04-18 DIAGNOSIS — S52.609A CLOSED FRACTURE OF LOWER END OF RADIUS AND ULNA: Primary | ICD-10-CM

## 2019-04-24 ENCOUNTER — ANCILLARY PROCEDURE (OUTPATIENT)
Dept: GENERAL RADIOLOGY | Facility: CLINIC | Age: 24
End: 2019-04-24
Attending: ORTHOPAEDIC SURGERY

## 2019-04-24 ENCOUNTER — OFFICE VISIT (OUTPATIENT)
Dept: ORTHOPEDICS | Facility: CLINIC | Age: 24
End: 2019-04-24

## 2019-04-24 DIAGNOSIS — S52.609A CLOSED FRACTURE OF LOWER END OF RADIUS AND ULNA: ICD-10-CM

## 2019-04-24 DIAGNOSIS — S52.372D CLOSED GALEAZZI'S FRACTURE OF LEFT RADIUS WITH ROUTINE HEALING, SUBSEQUENT ENCOUNTER: Primary | ICD-10-CM

## 2019-04-24 DIAGNOSIS — S52.509A CLOSED FRACTURE OF LOWER END OF RADIUS AND ULNA: ICD-10-CM

## 2019-04-24 NOTE — NURSING NOTE
Reason For Visit:   Chief Complaint   Patient presents with     Follow Up     DOS 10/23/2018 Open Reduction Internal Fixation Left Radial Shaft Fracture, Possible Triangular Fibrocartilage Complex Repair - Left       Primary MD: Niko Khanna  Ref. MD: Chiara    Age: 24 year old    ?  No      There were no vitals taken for this visit.      Pain Assessment  Patient Currently in Pain: Denies               QuickDASH Assessment  QuickDASH Main 12/17/2018   1.Open a tight or new jar. Moderate difficulty   2. Do heavy household chores (e.g., wash walls, floors) Moderate difficulty   3. Carry a shopping bag or briefcase. Mild difficulty   4. Wash your back. Severe difficulty   5. Use a knife to cut food. Mild difficulty   6. Recreational activities in which you take some force or impact through your arm, shoulder or hand (e.g., golf, hammering, tennis, etc.). Severe difficulty   7. During the past week, to what extent has your arm, shoulder or hand problem interfered with your normal social activities with family, friends, neighbours or groups? Slightly   8. During the past week, were you limited in your work or other regular daily activities as a result of your arm, shoulder or hand problem? Slightly limited   9. Arm, shoulder or hand pain. Mild   10.Tingling (pins and needles) in your arm,shoulder or hand. None   11. During the past week, how much difficulty have you had sleeping because of the pain in your arm, shoulder or hand? ([place] number) Mild difficulty   Quickdash Ability Score 36.36          Current Outpatient Medications   Medication Sig Dispense Refill     levonorgestrel (MIRENA) 20 MCG/24HR IUD 1 each by Intrauterine route once       vitamin D3 (CHOLECALCIFEROL) 2000 units tablet Take 1 tablet by mouth daily         No Known Allergies    Ben Fuentes, [facility]

## 2019-04-24 NOTE — PROGRESS NOTES
[facility]  Orthopedics  Barron Ybarra MD  2019     Name: Gela Miles  MRN: [id number]  Age: 24 year old  : 1995  Referring provider: Referred Self     Chief Complaint:   Surgical follow up     Date of Surgery: 10/23/2018     Procedure Performed: Open reduction internal fixation left galleazi fx with TFCC repair     History of Present Illness:   Gela Miles is a 24 year old female 6 months status post the above procedure who presents for postoperative evaluation. She has been working with hand therapy and motion has improved greatly. Her pain is improved and now she just has very occasional ulnar sided discomfort with applying pressure and activities like biking. She is happy with her recoveyr and surgical results.    Physical Examination:  General: Healthy appearing female. Affect appropriate. Normal gait. Alert and oriented to surroundings.   Right Upper Extremity:   Incision is well-healed. No sensory or motor deficits. DRUJ is stable.   80   of supination   80   of pronation   80   of extension   80   of flexion    Pain free throughout ROM arc    Radiographs:   Radiographs of the left forearm - 2 views (2019):  X-rays show healing of her radial shaft fracture with plate in place, DRUJ appears reduced    I have independently reviewed the above imaging studies; the results were discussed with the patient.     Assessment:   24 year old female s/p ORIF left radial shaft and TFCC repair, progressing as expected with good improvement in forearm rotation.      Plan:     She can discontinue hand therapy once she feels like progress has plateaued.     No activity restrictions at this point.     She will let me know if she has any further questions or concerns    Follow up as needed.     Barron Ybarra MD      Scribe Disclosure:  I, Erna Tinjaero, am serving as a scribe to document services personally performed by Barron Ybarra MD at this visit, based upon the provider's statements to me.  All documentation has been reviewed by the aforementioned provider prior to being entered into the official medical record.

## 2019-04-24 NOTE — LETTER
2019       RE: Gela Miles  [address]  [facility] [address]     Dear Colleague,    Thank you for referring your patient, Gela Miles, to the [facility] at [facility]. Please see a copy of my visit note below.    [facility]  Orthopedics  Barron Ybarra MD  2019     Name: Gela Miles  MRN: [id number]  Age: 24 year old  : 1995  Referring provider: Referred Self     Chief Complaint:   Surgical follow up     Date of Surgery: 10/23/2018     Procedure Performed: Open reduction internal fixation left galleazi fx with TFCC repair     History of Present Illness:   Gela Miles is a 24 year old female 6 months status post the above procedure who presents for postoperative evaluation. She has been working with hand therapy and motion has improved greatly. Her pain is improved and now she just has very occasional ulnar sided discomfort with applying pressure and activities like biking. She is happy with her recoveyr and surgical results.    Physical Examination:  General: Healthy appearing female. Affect appropriate. Normal gait. Alert and oriented to surroundings.   Right Upper Extremity:   Incision is well-healed. No sensory or motor deficits. DRUJ is stable.   80   of supination   80   of pronation   80   of extension   80   of flexion    Pain free throughout ROM arc    Radiographs:   Radiographs of the left forearm - 2 views (2019):  X-rays show healing of her radial shaft fracture with plate in place, DRUJ appears reduced    I have independently reviewed the above imaging studies; the results were discussed with the patient.     Assessment:   24 year old female s/p ORIF left radial shaft and TFCC repair, progressing as expected with good improvement in forearm rotation.      Plan:     She can discontinue hand therapy once she feels like progress has plateaued.     No activity restrictions at this point.     She will let me know if she has  any further questions or concerns    Follow up as needed.     Barron Ybarra MD    Scribe Disclosure:  I, Erna Tinajero, am serving as a scribe to document services personally performed by Barron Ybarra MD at this visit, based upon the provider's statements to me. All documentation has been reviewed by the aforementioned provider prior to being entered into the official medical record.

## 2019-05-29 PROBLEM — Z47.89 AFTERCARE FOLLOWING SURGERY OF THE MUSCULOSKELETAL SYSTEM: Status: RESOLVED | Noted: 2018-11-21 | Resolved: 2019-05-29

## 2019-05-29 PROBLEM — S52.302A: Status: RESOLVED | Noted: 2018-11-21 | Resolved: 2019-05-29

## 2019-05-29 PROBLEM — M25.532 LEFT WRIST PAIN: Status: RESOLVED | Noted: 2018-11-21 | Resolved: 2019-05-29

## 2019-12-18 ENCOUNTER — OFFICE VISIT (OUTPATIENT)
Dept: OBGYN | Facility: CLINIC | Age: 24
End: 2019-12-18
Payer: COMMERCIAL

## 2019-12-18 VITALS
BODY MASS INDEX: 26.66 KG/M2 | SYSTOLIC BLOOD PRESSURE: 118 MMHG | WEIGHT: 180 LBS | DIASTOLIC BLOOD PRESSURE: 84 MMHG | HEIGHT: 69 IN

## 2019-12-18 DIAGNOSIS — Z30.430 ENCOUNTER FOR IUD INSERTION: ICD-10-CM

## 2019-12-18 DIAGNOSIS — Z30.432 ENCOUNTER FOR IUD REMOVAL: ICD-10-CM

## 2019-12-18 DIAGNOSIS — Z30.433 ENCOUNTER FOR REMOVAL AND REINSERTION OF INTRAUTERINE CONTRACEPTIVE DEVICE (IUD): Primary | ICD-10-CM

## 2019-12-18 LAB — HCG UR QL: NEGATIVE

## 2019-12-18 PROCEDURE — 58300 INSERT INTRAUTERINE DEVICE: CPT | Performed by: OBSTETRICS & GYNECOLOGY

## 2019-12-18 PROCEDURE — 81025 URINE PREGNANCY TEST: CPT | Performed by: OBSTETRICS & GYNECOLOGY

## 2019-12-18 PROCEDURE — 58301 REMOVE INTRAUTERINE DEVICE: CPT | Performed by: OBSTETRICS & GYNECOLOGY

## 2019-12-18 ASSESSMENT — MIFFLIN-ST. JEOR: SCORE: 1612.91

## 2019-12-18 NOTE — PROGRESS NOTES
"Gela Miles is a 24 year old female who presents for IUD removal and re-insertion.  She has done well with the Mirena, wishes another.  Her partner had been feeling some discomfort from the IUD string, she reports that her provider \"talked\" the string and the problem resolved.  She has spotting every month.  Option of IUD was discussed.  Risks and benefits were reviewed.  Risk of perforation, and subsequent need for surgical removal with laparoscopy or laparotomy was reviewed.  Her questions were answered.    OBJECTIVE: healthy female in NAD.  /84   Ht 1.74 m (5' 8.5\")   Wt 81.6 kg (180 lb)   BMI 26.97 kg/m  .  The uterus was normal sized, mid-position.  Speculum was placed in the vagina, and hibiclens prep used.  The IUD string is grasped with a ring forceps, and removed without difficulty.  The cervix was grasped anteriorly with a tenaculum. The uterus sounded to 7 1/2 cm.  The tapered blue dilators was used to gently dilate the internal cervical os.  Using standard technique, a Mirena IUD was placed in the endometrial cavity without difficulty.  The string was trimmed.  The instruments were removed.  She tolerated the procedure well.    ASSESSMENT: IUD removal and replacement.    PLAN: RTC routinely or prn.  Standard precautions.     "

## 2019-12-18 NOTE — PATIENT INSTRUCTIONS

## 2020-03-11 ENCOUNTER — HEALTH MAINTENANCE LETTER (OUTPATIENT)
Age: 25
End: 2020-03-11

## 2021-01-03 ENCOUNTER — HEALTH MAINTENANCE LETTER (OUTPATIENT)
Age: 26
End: 2021-01-03

## 2021-10-10 ENCOUNTER — HEALTH MAINTENANCE LETTER (OUTPATIENT)
Age: 26
End: 2021-10-10

## 2022-01-29 ENCOUNTER — HEALTH MAINTENANCE LETTER (OUTPATIENT)
Age: 27
End: 2022-01-29

## 2022-09-18 ENCOUNTER — HEALTH MAINTENANCE LETTER (OUTPATIENT)
Age: 27
End: 2022-09-18

## 2023-05-07 ENCOUNTER — HEALTH MAINTENANCE LETTER (OUTPATIENT)
Age: 28
End: 2023-05-07

## (undated) DEVICE — SU VICRYL 2-0 SH 27" UND J417H

## (undated) DEVICE — SLING ARM MED 79-99155

## (undated) DEVICE — CAST PLASTER SPLINT 4X15" 7394

## (undated) DEVICE — PACK HAND CUSTOM ASC

## (undated) DEVICE — SU VICRYL 3-0 SH 27" UND J416H

## (undated) DEVICE — BRUSH SURGICAL SCRUB W/4% CHG SOL 25ML 371073

## (undated) DEVICE — DRSG STERI STRIP 1/2X4" R1547

## (undated) DEVICE — DRAPE C-ARM OEC MINI VIEW 6800   00-901917-01

## (undated) DEVICE — GLOVE PROTEXIS BLUE W/NEU-THERA 6.5  2D73EB65

## (undated) DEVICE — DRSG GAUZE 4X4" 3033

## (undated) DEVICE — ESU HOLSTER PLASTIC DISP E2400

## (undated) DEVICE — CAST PADDING 4" STERILE 9044S

## (undated) DEVICE — LINEN ORTHO PACK 5446

## (undated) DEVICE — SOL NACL 0.9% IRRIG 1000ML BOTTLE 2F7124

## (undated) DEVICE — SU FIBERWIRE 2-0 TAPER CUT AR-7220

## (undated) DEVICE — DRSG KERLIX FLUFFS X5

## (undated) DEVICE — PAD CHUX UNDERPAD 30X30"

## (undated) DEVICE — GLOVE PROTEXIS POWDER FREE SMT 6.5  2D72PT65X

## (undated) DEVICE — SLING ARM LG 79-99157

## (undated) DEVICE — COVER CAMERA IN-LIGHT DISP LT-C02

## (undated) DEVICE — TOURNIQUET SGL BLADDER 18"X4" RED 5921-218-135

## (undated) DEVICE — SUCTION MANIFOLD NEPTUNE 2 SYS 1 PORT 702-025-000

## (undated) DEVICE — PREP CHLORAPREP 26ML TINTED ORANGE  260815

## (undated) RX ORDER — FENTANYL CITRATE 50 UG/ML
INJECTION, SOLUTION INTRAMUSCULAR; INTRAVENOUS
Status: DISPENSED
Start: 2018-10-23

## (undated) RX ORDER — PROPOFOL 10 MG/ML
INJECTION, EMULSION INTRAVENOUS
Status: DISPENSED
Start: 2018-10-23

## (undated) RX ORDER — KETAMINE HYDROCHLORIDE 10 MG/ML
INJECTION INTRAMUSCULAR; INTRAVENOUS
Status: DISPENSED
Start: 2018-10-23

## (undated) RX ORDER — KETOROLAC TROMETHAMINE 30 MG/ML
INJECTION, SOLUTION INTRAMUSCULAR; INTRAVENOUS
Status: DISPENSED
Start: 2018-10-23

## (undated) RX ORDER — CEFAZOLIN SODIUM 1 G/50ML
SOLUTION INTRAVENOUS
Status: DISPENSED
Start: 2018-10-23

## (undated) RX ORDER — ACETAMINOPHEN 325 MG/1
TABLET ORAL
Status: DISPENSED
Start: 2018-10-23

## (undated) RX ORDER — GABAPENTIN 300 MG/1
CAPSULE ORAL
Status: DISPENSED
Start: 2018-10-23

## (undated) RX ORDER — ONDANSETRON 2 MG/ML
INJECTION INTRAMUSCULAR; INTRAVENOUS
Status: DISPENSED
Start: 2018-10-23